# Patient Record
Sex: FEMALE | Race: WHITE | NOT HISPANIC OR LATINO | Employment: STUDENT | URBAN - METROPOLITAN AREA
[De-identification: names, ages, dates, MRNs, and addresses within clinical notes are randomized per-mention and may not be internally consistent; named-entity substitution may affect disease eponyms.]

---
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
## 2023-02-16 DIAGNOSIS — Z30.09 GENERAL COUNSELING FOR PRESCRIPTION OF ORAL CONTRACEPTIVES: ICD-10-CM

## 2023-02-17 RX ORDER — DESOGESTREL AND ETHINYL ESTRADIOL 0.15-0.03
1 KIT ORAL DAILY
Qty: 84 TABLET | Refills: 0 | Status: SHIPPED | OUTPATIENT
Start: 2023-02-17 | End: 2023-05-11

## 2023-03-28 ENCOUNTER — TELEPHONE (OUTPATIENT)
Dept: ORTHOPEDICS | Facility: CLINIC | Age: 18
End: 2023-03-28
Payer: COMMERCIAL

## 2023-03-28 NOTE — TELEPHONE ENCOUNTER
Offered patient earlier time slot per provider request. Patient would like to keep current time slot.     Wilfred Silvestre ATC

## 2023-03-30 ENCOUNTER — TELEPHONE (OUTPATIENT)
Dept: ORTHOPEDICS | Facility: CLINIC | Age: 18
End: 2023-03-30
Payer: COMMERCIAL

## 2023-03-30 NOTE — TELEPHONE ENCOUNTER
Per Provider, Patient has not had work up and should be seen by sports med before she is ortho surgery. Patient was called agreed to reschedule with Dr. Bolton on the same day.     Wilfred Silvestre ATC

## 2023-04-07 ENCOUNTER — ANCILLARY PROCEDURE (OUTPATIENT)
Dept: GENERAL RADIOLOGY | Facility: CLINIC | Age: 18
End: 2023-04-07
Attending: STUDENT IN AN ORGANIZED HEALTH CARE EDUCATION/TRAINING PROGRAM
Payer: COMMERCIAL

## 2023-04-07 ENCOUNTER — OFFICE VISIT (OUTPATIENT)
Dept: ORTHOPEDICS | Facility: CLINIC | Age: 18
End: 2023-04-07
Payer: COMMERCIAL

## 2023-04-07 VITALS — BODY MASS INDEX: 22.82 KG/M2 | HEIGHT: 71 IN | WEIGHT: 163 LBS

## 2023-04-07 DIAGNOSIS — M25.512 ACUTE PAIN OF LEFT SHOULDER: ICD-10-CM

## 2023-04-07 DIAGNOSIS — M25.312 SHOULDER INSTABILITY, LEFT: ICD-10-CM

## 2023-04-07 DIAGNOSIS — S43.432A SUPERIOR LABRUM ANTERIOR-TO-POSTERIOR (SLAP) TEAR OF LEFT SHOULDER: ICD-10-CM

## 2023-04-07 DIAGNOSIS — M25.512 ACUTE PAIN OF LEFT SHOULDER: Primary | ICD-10-CM

## 2023-04-07 PROCEDURE — 99204 OFFICE O/P NEW MOD 45 MIN: CPT | Performed by: STUDENT IN AN ORGANIZED HEALTH CARE EDUCATION/TRAINING PROGRAM

## 2023-04-07 PROCEDURE — 73030 X-RAY EXAM OF SHOULDER: CPT | Mod: TC | Performed by: RADIOLOGY

## 2023-04-07 NOTE — LETTER
4/7/2023         RE: Bert Diaz  [address] [address]        Dear Colleague,    Thank you for referring your patient, Bert Diaz, to the [facility] [place]. Please see a copy of my visit note below.    ASSESSMENT & PLAN    1. Acute pain of left shoulder    2. Shoulder instability, left    3. 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 History, examination and X-ray imaging were reviewed today, most consistent with glenohumeral instability with suspected underlying SLAP tear. Of note, the patient experienced an instability event (left shoulder subluxation) during today's clinic visit with light resistance during the [place]'s maneuver. We reviewed work up and treatment options, and plan to proceed with the following:     - Your left shoulder MR Arthrogram has been ordered. Please call [phone] to schedule.     - A referral has been placed for formal physical therapy. Please call [phone] to schedule with one of our Sports Medicine Physical Therapists (Sanrda Zambrano or Mat Yip). Exercises to include passive range of motion exercises with progression to proprioceptive neuromuscular facilitation exercises and scapular stabilization exercises.  Progress to rotator cuff strengthening and capsular stretching exercises with use of modalities as needed.  Continued progression to functional and sport specific strengthening exercises with home exercise prescription.    - Referral placed to schedule a surgical consult with Dr. Marion Krishna, our sports/shoulder surgeon. Please call [phone] to schedule this consult to take place after the MR Arthrogram and 4-6 weeks of physical therapy.     - Ibuprofen 400-600 mg with food every 4-6  hours as needed for pain.   - Ice the shoulder for 10-15 minutes 3-4 times per day as needed for pain.   - Sling for comfort as needed.  - Avoid activities that provoke pain or instability sensation. This includes lifting and repetitive activity above shoulder height.     You may call our direct clinic number ([phone]) at any time with questions or concerns.    Jacqui Barcenas MD, [facility]    -----    SUBJECTIVE  Bert Diaz is a/an 17 year old Right handed female who is seen as a self referral for evaluation of left shoulder pain and instability. The patient is seen with their father.    Onset: 4 month(s) ago. Reports insidious onset of left shoulder pain and sensation of instability without acute precipitating event. She has a history of similar symptoms of the right shoulder following a volleyball injury, for which the right shoulder dislocated and a labral tear was identified and repaired in 2018. Her left shoulder developed similar symptoms without acute injury or inciting event approximately 4 months ago. Symptoms have gradually worsened and limit her activities.    Location of Pain: left anterior and deep shoulder  Rating of Pain at worst: 7/10  Rating of Pain Currently: 0/10 at rest  Worsened by: pain after running, sleeping with arm overhead, lifting overhead  Better with: rest / activity avoidance  Treatments tried: rest/activity avoidance and Tylenol PRN  Associated symptoms: feeling of instability and popping / shifting  Orthopedic history: YES - patient reports h/o instability in bilateral shoulders  Relevant surgical history: YES - right shoulder arthroscopic glenoid labrum repair Date: 11/21/2018 at [place]. She completed pre- and post-op rehab and has noted excellent results.   Social history: Freshman at [employer], generally active but not involved in college athletics    Past Medical History:   Diagnosis Date     Transitory tachypnea  "of      48 hours in special care     Social History     Socioeconomic History     Marital status: Single   Tobacco Use     Smoking status: Never     Smokeless tobacco: Never     Tobacco comments:     Non-smoking home   Vaping Use     Vaping status: Never Used   Substance and Sexual Activity     Alcohol use: No     Drug use: No     Sexual activity: Never         Patient's past medical, surgical, social, and family histories were reviewed today and no changes are noted.    REVIEW OF SYSTEMS:  10 point ROS is negative other than symptoms noted above in HPI, Past Medical History or as stated below  Constitutional: NEGATIVE for fever, chills, change in weight  Skin: NEGATIVE for worrisome rashes, moles or lesions  GI/: NEGATIVE for bowel or bladder changes  Neuro: NEGATIVE for weakness, dizziness or paresthesias    OBJECTIVE:  Ht 1.803 m (5' 11\")   Wt 73.9 kg (163 lb)   BMI 22.73 kg/m     General: healthy, alert and in no distress  HEENT: no scleral icterus or conjunctival erythema  Skin: no suspicious lesions or rash. No jaundice.  CV: regular rhythm by palpation  Resp: normal respiratory effort without conversational dyspnea   Psych: normal mood and affect  Gait: normal steady gait with appropriate coordination and balance  Neuro: normal light touch sensory exam of the bilateral upper extremities.    MSK:  LEFT SHOULDER  Inspection:    No swelling, bruising, discoloration, or obvious deformity or asymmetry  Palpation:    Tender about the anterior capsule. Remainder of bony and tendinous landmarks are nontender.  Active Range of Motion:     Abduction 1800, FF 1800, , IR T2.    Strength:    Scapular plane abduction 5/5,  ER 5/5, IR 5/5 +mild pain, biceps 5/5, triceps 5/5  Special Tests:    Positive: McKenzie's provokes subluxation event with minimal resistance, apprehension/relocation, load and shift and clunk    Negative: Neer's, Fuller', supraspinatus (empty can), drop arm/painful arc, lift-off, " Speed's and Erika's    Independent visualization of the below image:  Recent Results (from the past 24 hour(s))   XR Shoulder Left G/E 3 Views    Narrative    XR SHOULDER LEFT G/E 3 VIEWS 4/7/2023 3:30 PM    HISTORY: Acute pain of left shoulder    COMPARISON: None.      Impression    IMPRESSION: No fracture or dislocation. No degenerative changes.    WIL WHITTAKER MD         SYSTEM ID:  [id number]     Jacqui Barcenas MD, [facility]        Again, thank you for allowing me to participate in the care of your patient.        Sincerely,        Jacqui Barcenas MD

## 2023-04-07 NOTE — PROGRESS NOTES
ASSESSMENT & PLAN    1. Acute pain of left shoulder    2. Shoulder instability, left    3. 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 History, examination and X-ray imaging were reviewed today, most consistent with glenohumeral instability with suspected underlying SLAP tear. Of note, the patient experienced an instability event (left shoulder subluxation) during today's clinic visit with light resistance during the Lipan's maneuver. We reviewed work up and treatment options, and plan to proceed with the following:     - Your left shoulder MR Arthrogram has been ordered. Please call [phone] to schedule.     - A referral has been placed for formal physical therapy. Please call [phone] to schedule with one of our Sports Medicine Physical Therapists (Sandra Zambrano or Mat Yip). Exercises to include passive range of motion exercises with progression to proprioceptive neuromuscular facilitation exercises and scapular stabilization exercises.  Progress to rotator cuff strengthening and capsular stretching exercises with use of modalities as needed.  Continued progression to functional and sport specific strengthening exercises with home exercise prescription.    - Referral placed to schedule a surgical consult with Dr. Marion Krishna, our sports/shoulder surgeon. Please call [phone] to schedule this consult to take place after the MR Arthrogram and 4-6 weeks of physical therapy.     - Ibuprofen 400-600 mg with food every 4-6 hours as needed for pain.   - Ice the shoulder for 10-15 minutes 3-4 times per day as needed for pain.   - Sling for comfort as needed.  - Avoid activities that provoke pain or instability sensation. This includes lifting and repetitive activity above shoulder height.     You may  call our direct clinic number ([phone]) at any time with questions or concerns.    Jacqui Barcenas MD, [facility]    -----    SUBJECTIVE  Bert Diaz is a/an 17 year old Right handed female who is seen as a self referral for evaluation of left shoulder pain and instability. The patient is seen with their father.    Onset: 4 month(s) ago. Reports insidious onset of left shoulder pain and sensation of instability without acute precipitating event. She has a history of similar symptoms of the right shoulder following a volleyball injury, for which the right shoulder dislocated and a labral tear was identified and repaired in 2018. Her left shoulder developed similar symptoms without acute injury or inciting event approximately 4 months ago. Symptoms have gradually worsened and limit her activities.    Location of Pain: left anterior and deep shoulder  Rating of Pain at worst: 7/10  Rating of Pain Currently: 0/10 at rest  Worsened by: pain after running, sleeping with arm overhead, lifting overhead  Better with: rest / activity avoidance  Treatments tried: rest/activity avoidance and Tylenol PRN  Associated symptoms: feeling of instability and popping / shifting  Orthopedic history: YES - patient reports h/o instability in bilateral shoulders  Relevant surgical history: YES - right shoulder arthroscopic glenoid labrum repair Date: 2018 at [facility]. She completed pre- and post-op rehab and has noted excellent results.   Social history: Freshman at [employer], generally active but not involved in college athletics    Past Medical History:   Diagnosis Date     Transitory tachypnea of      48 hours in special care     Social History     Socioeconomic History     Marital status: Single   Tobacco Use     Smoking status: Never     Smokeless tobacco: Never     Tobacco comments:     Non-smoking home   Vaping Use     Vaping status: Never Used   Substance  "and Sexual Activity     Alcohol use: No     Drug use: No     Sexual activity: Never         Patient's past medical, surgical, social, and family histories were reviewed today and no changes are noted.    REVIEW OF SYSTEMS:  10 point ROS is negative other than symptoms noted above in HPI, Past Medical History or as stated below  Constitutional: NEGATIVE for fever, chills, change in weight  Skin: NEGATIVE for worrisome rashes, moles or lesions  GI/: NEGATIVE for bowel or bladder changes  Neuro: NEGATIVE for weakness, dizziness or paresthesias    OBJECTIVE:  Ht 1.803 m (5' 11\")   Wt 73.9 kg (163 lb)   BMI 22.73 kg/m     General: healthy, alert and in no distress  HEENT: no scleral icterus or conjunctival erythema  Skin: no suspicious lesions or rash. No jaundice.  CV: regular rhythm by palpation  Resp: normal respiratory effort without conversational dyspnea   Psych: normal mood and affect  Gait: normal steady gait with appropriate coordination and balance  Neuro: normal light touch sensory exam of the bilateral upper extremities.    MSK:  LEFT SHOULDER  Inspection:    No swelling, bruising, discoloration, or obvious deformity or asymmetry  Palpation:    Tender about the anterior capsule. Remainder of bony and tendinous landmarks are nontender.  Active Range of Motion:     Abduction 1800, FF 1800, , IR T2.    Strength:    Scapular plane abduction 5/5,  ER 5/5, IR 5/5 +mild pain, biceps 5/5, triceps 5/5  Special Tests:    Positive: Fields's provokes subluxation event with minimal resistance, apprehension/relocation, load and shift and clunk    Negative: Neer's, Fuller', supraspinatus (empty can), drop arm/painful arc, lift-off, Speed's and Yergason's    Independent visualization of the below image:  Recent Results (from the past 24 hour(s))   XR Shoulder Left G/E 3 Views    Narrative    XR SHOULDER LEFT G/E 3 VIEWS 4/7/2023 3:30 PM    HISTORY: Acute pain of left shoulder    COMPARISON: None.      Impression "    IMPRESSION: No fracture or dislocation. No degenerative changes.    WIL WHITTAKER MD         SYSTEM ID:  [id number]     Jacqui Barcenas MD, [facility]

## 2023-04-07 NOTE — PATIENT INSTRUCTIONS
1. Acute pain of left shoulder    2. Shoulder instability, left    3. 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 History, examination and X-ray imaging were reviewed today, most consistent with suspected glenoid labral tear with instability event (subluxation) during today's clinic visit. We reviewed work up and treatment options, and plan to proceed with the following:     - Your left shoulder MR Arthrogram has been ordered. Please call [phone] to schedule.     - A referral has been placed for formal physical therapy. Please call [phone] to schedule with one of our Sports Medicine Physical Therapists (Sandra Zambrano or Mat Yip). Exercises to include passive range of motion exercises with progression to proprioceptive neuromuscular facilitation exercises and scapular stabilization exercises.  Progress to rotator cuff strengthening and capsular stretching exercises with use of modalities as needed.  Continued progression to functional and sport specific strengthening exercises with home exercise prescription.    - Referral placed to schedule a surgical consult with Dr. Marion Krishna, our sports/shoulder surgeon. Please call [phone] to schedule this consult to take place after the MR Arthrogram and 4-6 weeks of physical therapy.     - Ibuprofen 400-600 mg with food every 4-6 hours as needed for pain.   - Ice the shoulder for 10-15 minutes 3-4 times per day as needed for pain.   - Sling for comfort as needed.  - Avoid activities that provoke pain or instability sensation. This includes lifting and repetitive activity above shoulder height.     You may call our direct clinic number ([phone]) at any time with questions or concerns.    Jacqui Barcenas MD, [facility]

## 2023-05-02 ENCOUNTER — E-VISIT (OUTPATIENT)
Dept: URGENT CARE | Facility: CLINIC | Age: 18
End: 2023-05-02
Payer: COMMERCIAL

## 2023-05-02 DIAGNOSIS — N39.0 ACUTE UTI (URINARY TRACT INFECTION): Primary | ICD-10-CM

## 2023-05-02 PROCEDURE — 99421 OL DIG E/M SVC 5-10 MIN: CPT | Performed by: EMERGENCY MEDICINE

## 2023-05-02 RX ORDER — NITROFURANTOIN 25; 75 MG/1; MG/1
100 CAPSULE ORAL 2 TIMES DAILY
Qty: 10 CAPSULE | Refills: 0 | Status: SHIPPED | OUTPATIENT
Start: 2023-05-02 | End: 2023-05-07

## 2023-05-02 NOTE — PATIENT INSTRUCTIONS
Dear Bert Diaz    After reviewing your responses, I've been able to diagnose you with a urinary tract infection, which is a common infection of the bladder with bacteria.  This is not a sexually transmitted infection, though urinating immediately after intercourse can help prevent infections.  Drinking lots of fluids is also helpful to clear your current infection and prevent the next one.      I have sent a prescription for antibiotics to your pharmacy to treat this infection.    It is important that you take all of your prescribed medication even if your symptoms are improving after a few doses.  Taking all of your medicine helps prevent the symptoms from returning.     If your symptoms worsen, you develop pain in your back or stomach, develop fevers, or are not improving in 5 days, please contact your primary care provider for an appointment or visit any of our convenient Walk-in or Urgent Care Centers to be seen, which can be found on our website here.    Thanks again for choosing us as your health care partner,    Ayush Thomas MD    Urinary Tract Infections in Women  Urinary tract infections (UTIs) are most often caused by bacteria. These bacteria enter the urinary tract. The bacteria may come from inside the body. Or they may travel from the skin outside the rectum or vagina into the urethra. Female anatomy makes it easy for bacteria from the bowel to enter a woman s urinary tract, which is the most common source of UTI. This means women develop UTIs more often than men. Pain in or around the urinary tract is a common UTI symptom. But the only way to know for sure if you have a UTI for the healthcare provider to test your urine. The two tests that may be done are the urinalysis and urine culture.    Types of UTIs    Cystitis. A bladder infection (cystitis) is the most common UTI in women. You may have urgent or frequent need to pee. You may also have pain, burning when you pee, and bloody  urine.    Urethritis. This is an inflamed urethra, which is the tube that carries urine from the bladder to outside the body. You may have lower stomach or back pain. You may also have urgent or frequent need to pee.    Pyelonephritis. This is a kidney infection. If not treated, it can be serious and damage your kidneys. In severe cases, you may need to stay in the hospital. You may have a fever and lower back pain.    Medicines to treat a UTI  Most UTIs are treated with antibiotics. These kill the bacteria. The length of time you need to take them depends on the type of infection. It may be as short as 3 days. If you have repeated UTIs, you may need a low-dose antibiotic for several months. Take antibiotics exactly as directed. Don t stop taking them until all of the medicine is gone, even if you feel better. If you stop taking the antibiotic too soon, the infection may not go away. You may also develop a resistance to the antibiotic. This can make it much harder to treat.  Lifestyle changes to treat and prevent UTIs  The lifestyle changes below will help get rid of your UTI. They may also help prevent future UTIs.    Drink plenty of fluids. This includes water, juice, or other caffeine-free drinks. Fluids help flush bacteria out of your body.    Empty your bladder. Always empty your bladder when you feel the urge to pee. And always pee before going to sleep. Urine that stays in your bladder can lead to infection. Try to pee before and after sex as well.    Practice good personal hygiene. Wipe yourself from front to back after using the toilet. This helps keep bacteria from getting into the urethra.    Wear cotton underwear. Don't wear synthetic or tight-fitting underwear that can trap moisture. Change out of wet bathing suits and workout clothing quickly.    Take showers. Showers are better than baths for preventing UTIs.    Use condoms during sex. These help prevent UTIs caused by sexually transmitted bacteria.  Also don't use spermicides during sex. These can increase the risk for UTIs. Choose other forms of birth control instead. For women who tend to get UTIs after sex, a low-dose of a preventive antibiotic may be used. Be sure to discuss this option with your healthcare provider.    Follow up with your healthcare provider as directed. They may test to make sure the infection has cleared. If needed, more treatment may be started.  [employer] last reviewed this educational content on 9/1/2021 2000-2022 [employer]. All rights reserved. This information is not intended as a substitute for professional medical care. Always follow your healthcare professional's instructions.

## 2023-05-11 DIAGNOSIS — Z30.09 GENERAL COUNSELING FOR PRESCRIPTION OF ORAL CONTRACEPTIVES: ICD-10-CM

## 2023-05-11 RX ORDER — DESOGESTREL AND ETHINYL ESTRADIOL 0.15-0.03
1 KIT ORAL DAILY
Qty: 28 TABLET | Refills: 0 | Status: SHIPPED | OUTPATIENT
Start: 2023-05-11 | End: 2023-05-25

## 2023-05-11 NOTE — TELEPHONE ENCOUNTER
Routing refill request to provider for review/approval because:  Bethany given x1 and patient did not follow up, please advise  Pt did see a [place] provider in December, but protocol states failure. Pt has been contacted x3 in February to schedule.

## 2023-05-15 NOTE — TELEPHONE ENCOUNTER
2nd attempt: left VM to return call. If pt calls back, please assist in scheduling annual physical. Needs appointment for further refills.    Kathrin Ibarra MA 4:12 PM 5/15/2023

## 2023-05-24 ASSESSMENT — PATIENT HEALTH QUESTIONNAIRE - PHQ9
10. IF YOU CHECKED OFF ANY PROBLEMS, HOW DIFFICULT HAVE THESE PROBLEMS MADE IT FOR YOU TO DO YOUR WORK, TAKE CARE OF THINGS AT HOME, OR GET ALONG WITH OTHER PEOPLE: SOMEWHAT DIFFICULT
SUM OF ALL RESPONSES TO PHQ QUESTIONS 1-9: 3
SUM OF ALL RESPONSES TO PHQ QUESTIONS 1-9: 3

## 2023-05-25 ENCOUNTER — OFFICE VISIT (OUTPATIENT)
Dept: PEDIATRICS | Facility: CLINIC | Age: 18
End: 2023-05-25
Payer: COMMERCIAL

## 2023-05-25 VITALS
WEIGHT: 165.5 LBS | BODY MASS INDEX: 23.17 KG/M2 | HEART RATE: 79 BPM | SYSTOLIC BLOOD PRESSURE: 124 MMHG | TEMPERATURE: 98.3 F | OXYGEN SATURATION: 99 % | DIASTOLIC BLOOD PRESSURE: 76 MMHG | HEIGHT: 71 IN | RESPIRATION RATE: 16 BRPM

## 2023-05-25 DIAGNOSIS — Z11.59 NEED FOR HEPATITIS C SCREENING TEST: ICD-10-CM

## 2023-05-25 DIAGNOSIS — Z11.3 SCREENING FOR STDS (SEXUALLY TRANSMITTED DISEASES): ICD-10-CM

## 2023-05-25 DIAGNOSIS — Z11.4 SCREENING FOR HIV (HUMAN IMMUNODEFICIENCY VIRUS): ICD-10-CM

## 2023-05-25 DIAGNOSIS — F32.0 MILD MAJOR DEPRESSION, SINGLE EPISODE (H): ICD-10-CM

## 2023-05-25 DIAGNOSIS — F41.1 GAD (GENERALIZED ANXIETY DISORDER): ICD-10-CM

## 2023-05-25 DIAGNOSIS — Z30.09 GENERAL COUNSELING FOR PRESCRIPTION OF ORAL CONTRACEPTIVES: Primary | ICD-10-CM

## 2023-05-25 LAB
C TRACH DNA SPEC QL NAA+PROBE: NEGATIVE
CLUE CELLS: ABNORMAL
N GONORRHOEA DNA SPEC QL NAA+PROBE: NEGATIVE
TRICHOMONAS, WET PREP: ABNORMAL
WBC'S/HIGH POWER FIELD, WET PREP: ABNORMAL
YEAST, WET PREP: ABNORMAL

## 2023-05-25 PROCEDURE — 87591 N.GONORRHOEAE DNA AMP PROB: CPT | Performed by: NURSE PRACTITIONER

## 2023-05-25 PROCEDURE — 99213 OFFICE O/P EST LOW 20 MIN: CPT | Performed by: NURSE PRACTITIONER

## 2023-05-25 PROCEDURE — 86803 HEPATITIS C AB TEST: CPT | Performed by: NURSE PRACTITIONER

## 2023-05-25 PROCEDURE — 86780 TREPONEMA PALLIDUM: CPT | Performed by: NURSE PRACTITIONER

## 2023-05-25 PROCEDURE — 36415 COLL VENOUS BLD VENIPUNCTURE: CPT | Performed by: NURSE PRACTITIONER

## 2023-05-25 PROCEDURE — 87389 HIV-1 AG W/HIV-1&-2 AB AG IA: CPT | Performed by: NURSE PRACTITIONER

## 2023-05-25 PROCEDURE — 87491 CHLMYD TRACH DNA AMP PROBE: CPT | Performed by: NURSE PRACTITIONER

## 2023-05-25 PROCEDURE — 87210 SMEAR WET MOUNT SALINE/INK: CPT | Performed by: NURSE PRACTITIONER

## 2023-05-25 RX ORDER — DESOGESTREL AND ETHINYL ESTRADIOL 0.15-0.03
1 KIT ORAL DAILY
Qty: 84 TABLET | Refills: 3 | Status: SHIPPED | OUTPATIENT
Start: 2023-05-25 | End: 2024-04-08

## 2023-05-25 ASSESSMENT — PATIENT HEALTH QUESTIONNAIRE - PHQ9
SUM OF ALL RESPONSES TO PHQ QUESTIONS 1-9: 3
10. IF YOU CHECKED OFF ANY PROBLEMS, HOW DIFFICULT HAVE THESE PROBLEMS MADE IT FOR YOU TO DO YOUR WORK, TAKE CARE OF THINGS AT HOME, OR GET ALONG WITH OTHER PEOPLE: SOMEWHAT DIFFICULT

## 2023-05-25 ASSESSMENT — PAIN SCALES - GENERAL: PAINLEVEL: NO PAIN (0)

## 2023-05-25 NOTE — PROGRESS NOTES
"  Assessment & Plan     General counseling for prescription of oral contraceptives  Risks, benefits and potential side effects of OCPs were reviewed with the patient and warning signs of serious side effects--including thromboembolic complications were reviewed.  She has no concerns with OCP, ok to fill. NO contraindications.  - desogestrel-ethinyl estradiol (ISIBLOOM) 0.15-30 MG-MCG tablet; Take 1 tablet by mouth daily Visit needed prior to additional fills.    Screening for STDs (sexually transmitted diseases)  Asymptomatic and no known exposures. Her partner has \"dysuria\" but hasn't been tested for STIs. If partner does not opt to test then should retest in 1-3 months.  - NEISSERIA GONORRHOEA PCR; Future  - CHLAMYDIA TRACHOMATIS PCR; Future  - Treponema Abs w Reflex to RPR and Titer; Future  - Wet prep - lab collect; Future  - NEISSERIA GONORRHOEA PCR  - CHLAMYDIA TRACHOMATIS PCR  - Treponema Abs w Reflex to RPR and Titer  - Wet prep - lab collect    Screening for HIV (human immunodeficiency virus)  - HIV Antigen Antibody Combo; Future  - HIV Antigen Antibody Combo    Need for hepatitis C screening test  - Hepatitis C Screen Reflex to HCV RNA Quant and Genotype; Future  - Hepatitis C Screen Reflex to HCV RNA Quant and Genotype    APPEL (generalized anxiety disorder)  Mild major depression, single episode (H)  Stable, no refills needed.         Patient Instructions   I will message you your lab results  Birth control refilled  Schedule physical before you go back to school       Nevaeh Barnes NP  [facility] JORGE Noel is a 17 year old, presenting for the following health issues:  Contraception (Patient is here for a refill of birth control and for STD testing.) and STD (Here for STD testing.)        5/25/2023    10:10 AM   Additional Questions   Roomed by Lynnette BROWN   Accompanied by self         5/25/2023    10:10 AM   Patient Reported Additional Medications   Patient reports taking " "the following new medications none     Contraception    STD    History of Present Illness       Reason for visit:  I need a refill for my birth control and i would also like to do a full STD screening.    She eats 2-3 servings of fruits and vegetables daily.She consumes 0 sweetened beverage(s) daily.She exercises with enough effort to increase her heart rate 30 to 60 minutes per day.  She exercises with enough effort to increase her heart rate 4 days per week.   She is taking medications regularly.    Today's PHQ-9         PHQ-9 Total Score: 3    PHQ-9 Q9 Thoughts of better off dead/self-harm past 2 weeks :   Not at all    How difficult have these problems made it for you to do your work, take care of things at home, or get along with other people: Somewhat difficult       Concern - Patient is here for refill of birth control and for STD testing.    Boyfriend had \"burning\" yesterday but no known hx of STIs or known STI exposures  Does not regularly use condoms  Denies dysuria, lesions, vag discharge    No concerns with OCP  Typically takes placebo pills  Denies hx of blood clot, non-smoker  Hx of migraine, no aura. Hasn't had headache in \"along time\"    Review of Systems         Objective    /76 (BP Location: Right arm, Patient Position: Sitting, Cuff Size: Adult Large)   Pulse 79   Temp 98.3  F (36.8  C) (Tympanic)   Resp 16   Ht 1.797 m (5' 10.75\")   Wt 75.1 kg (165 lb 8 oz)   LMP 05/11/2023 (Approximate)   SpO2 99%   BMI 23.25 kg/m    Body mass index is 23.25 kg/m .  Physical Exam   GENERAL: healthy, alert and no distress  PSYCH: mentation appears normal, affect normal/bright                    "

## 2023-05-25 NOTE — PATIENT INSTRUCTIONS
I will message you your lab results  Birth control refilled  Schedule physical before you go back to school

## 2023-05-26 LAB
HCV AB SERPL QL IA: NONREACTIVE
HIV 1+2 AB+HIV1 P24 AG SERPL QL IA: NONREACTIVE
T PALLIDUM AB SER QL: NONREACTIVE

## 2023-06-09 ENCOUNTER — E-VISIT (OUTPATIENT)
Dept: FAMILY MEDICINE | Facility: CLINIC | Age: 18
End: 2023-06-09
Payer: COMMERCIAL

## 2023-06-09 DIAGNOSIS — R30.0 DYSURIA: Primary | ICD-10-CM

## 2023-06-09 PROCEDURE — 99207 PR NON-BILLABLE SERV PER CHARTING: CPT | Performed by: PHYSICIAN ASSISTANT

## 2023-06-09 NOTE — PATIENT INSTRUCTIONS
Dear Bert Diaz,     After reviewing your responses, I would like you to come in for a urine test to make sure we treat you correctly. This urine test is to evaluate you for a possible urinary tract infection, and should be scheduled for today or tomorrow. Schedule a Lab Only appointment here.     Lab appointments are not available at most locations on the weekends. If no Lab Only appointment is available, you should be seen in any of our convenient Walk-in or Urgent Care Centers, which can be found on our website here.     You will receive instructions with your results in [place] once they are available.     If your symptoms worsen, you develop pain in your back or stomach, develop fevers, or are not improving in 5 days, please contact your primary care provider for an appointment or visit a Walk-in or Urgent Care Center to be seen.     Thanks again for choosing us as your health care partner,     Heather Trevino PA-C

## 2023-06-10 ENCOUNTER — OFFICE VISIT (OUTPATIENT)
Dept: URGENT CARE | Facility: URGENT CARE | Age: 18
End: 2023-06-10
Payer: COMMERCIAL

## 2023-06-10 VITALS
OXYGEN SATURATION: 98 % | TEMPERATURE: 98.7 F | HEART RATE: 84 BPM | SYSTOLIC BLOOD PRESSURE: 120 MMHG | DIASTOLIC BLOOD PRESSURE: 83 MMHG

## 2023-06-10 DIAGNOSIS — R30.0 DYSURIA: Primary | ICD-10-CM

## 2023-06-10 LAB
ALBUMIN UR-MCNC: NEGATIVE MG/DL
APPEARANCE UR: CLEAR
BACTERIA #/AREA URNS HPF: ABNORMAL /HPF
BILIRUB UR QL STRIP: NEGATIVE
COLOR UR AUTO: YELLOW
GLUCOSE UR STRIP-MCNC: NEGATIVE MG/DL
HGB UR QL STRIP: ABNORMAL
KETONES UR STRIP-MCNC: NEGATIVE MG/DL
LEUKOCYTE ESTERASE UR QL STRIP: NEGATIVE
NITRATE UR QL: NEGATIVE
PH UR STRIP: 6 [PH] (ref 5–7)
RBC #/AREA URNS AUTO: ABNORMAL /HPF
SP GR UR STRIP: >=1.03 (ref 1–1.03)
SQUAMOUS #/AREA URNS AUTO: ABNORMAL /LPF
UROBILINOGEN UR STRIP-ACNC: 0.2 E.U./DL
WBC #/AREA URNS AUTO: ABNORMAL /HPF

## 2023-06-10 PROCEDURE — 99213 OFFICE O/P EST LOW 20 MIN: CPT | Performed by: PHYSICIAN ASSISTANT

## 2023-06-10 PROCEDURE — 81001 URINALYSIS AUTO W/SCOPE: CPT | Performed by: PHYSICIAN ASSISTANT

## 2023-06-10 NOTE — PROGRESS NOTES
SUBJECTIVE:  Bert Diaz is a 17 year old female who comes in with concerns for possible UTI.  Patient states that she had a recent UTI and did an E-visit and given sulfa for 3 days.  Symptoms have returned.  She states that she does not have significant pain but does have some mild irritation with urination.  Denies any vaginal symptoms of discharge itching odor.  No STD concerns as just was recently tested.  She wants to have her urine checked to see if she still has a UTI.  Denies any blood in her urine.  No nausea, fever, vomiting, flank pain.  She is otherwise baseline health.    Past Medical History:   Diagnosis Date     Transitory tachypnea of      48 hours in special care     Current Outpatient Medications   Medication     desogestrel-ethinyl estradiol (ISIBLOOM) 0.15-30 MG-MCG tablet     escitalopram (LEXAPRO) 5 MG tablet     No current facility-administered medications for this visit.     Social History     Socioeconomic History     Marital status: Single     Spouse name: Not on file     Number of children: Not on file     Years of education: Not on file     Highest education level: Not on file   Occupational History     Not on file   Tobacco Use     Smoking status: Never     Smokeless tobacco: Never     Tobacco comments:     Non-smoking home   Vaping Use     Vaping status: Never Used   Substance and Sexual Activity     Alcohol use: No     Drug use: No     Sexual activity: Never   Other Topics Concern     Not on file   Social History Narrative     Not on file     Social Determinants of Health     Financial Resource Strain: Not on file   Food Insecurity: Not on file   Transportation Needs: Not on file   Physical Activity: Not on file   Stress: Not on file   Social Connections: Not on file   Intimate Partner Violence: Not on file   Housing Stability: Not on file     ROS  Negative other than stated above    Exam:  GENERAL APPEARANCE: healthy, alert and no distress  EYES: EOMI,  PERRL  ABDOMEN:   soft, nontender, no HSM or masses and bowel sounds normal.  No CVA tenderness noted  SKIN: no suspicious lesions or rashes    Results for orders placed or performed in visit on 06/10/23   UA Macroscopic with reflex to Microscopic and Culture     Status: Abnormal    Specimen: Urine, Clean Catch   Result Value Ref Range    Color Urine Yellow Colorless, Straw, Light Yellow, Yellow    Appearance Urine Clear Clear    Glucose Urine Negative Negative mg/dL    Bilirubin Urine Negative Negative    Ketones Urine Negative Negative mg/dL    Specific Gravity Urine >=1.030 1.003 - 1.035    Blood Urine Trace (A) Negative    pH Urine 6.0 5.0 - 7.0    Protein Albumin Urine Negative Negative mg/dL    Urobilinogen Urine 0.2 0.2, 1.0 E.U./dL    Nitrite Urine Negative Negative    Leukocyte Esterase Urine Negative Negative   UA Microscopic with Reflex to Culture     Status: Abnormal   Result Value Ref Range    Bacteria Urine Moderate (A) None Seen /HPF    RBC Urine 0-2 0-2 /HPF /HPF    WBC Urine 0-5 0-5 /HPF /HPF    Squamous Epithelials Urine Moderate (A) None Seen /LPF    Narrative    Urine Culture not indicated     assessment/plan:  (R30.0) Dysuria  (primary encounter diagnosis)  Comment:   Plan: UA Microscopic with Reflex to Culture,                 With a few day history of irritation with urination.  Her urine is clear and there is no evidence for infection.  Discussed in length the possibility of vaginal infection with similar symptoms and she does not believe so and does not need a wet prep at this time.  Concerns for STD has had a negative test recently.  Advised that she push fluid and continue to monitor symptoms.  If symptoms worsen call over the next 2 days and will send in prescription.  Patient notes understanding is agreement with above plan.

## 2023-09-07 ENCOUNTER — OFFICE VISIT (OUTPATIENT)
Dept: URGENT CARE | Facility: URGENT CARE | Age: 18
End: 2023-09-07
Payer: COMMERCIAL

## 2023-09-07 ENCOUNTER — E-VISIT (OUTPATIENT)
Dept: URGENT CARE | Facility: CLINIC | Age: 18
End: 2023-09-07
Payer: COMMERCIAL

## 2023-09-07 VITALS
BODY MASS INDEX: 23.18 KG/M2 | OXYGEN SATURATION: 94 % | DIASTOLIC BLOOD PRESSURE: 89 MMHG | SYSTOLIC BLOOD PRESSURE: 127 MMHG | WEIGHT: 165 LBS | TEMPERATURE: 98.4 F | HEART RATE: 85 BPM

## 2023-09-07 DIAGNOSIS — R30.0 DYSURIA: Primary | ICD-10-CM

## 2023-09-07 LAB
ALBUMIN UR-MCNC: NEGATIVE MG/DL
APPEARANCE UR: CLEAR
BACTERIA #/AREA URNS HPF: ABNORMAL /HPF
BILIRUB UR QL STRIP: NEGATIVE
COLOR UR AUTO: YELLOW
GLUCOSE UR STRIP-MCNC: NEGATIVE MG/DL
HGB UR QL STRIP: NEGATIVE
KETONES UR STRIP-MCNC: 15 MG/DL
LEUKOCYTE ESTERASE UR QL STRIP: ABNORMAL
NITRATE UR QL: NEGATIVE
PH UR STRIP: 7 [PH] (ref 5–7)
RBC #/AREA URNS AUTO: ABNORMAL /HPF
SP GR UR STRIP: 1.01 (ref 1–1.03)
SQUAMOUS #/AREA URNS AUTO: ABNORMAL /LPF
UROBILINOGEN UR STRIP-ACNC: 0.2 E.U./DL
WBC #/AREA URNS AUTO: ABNORMAL /HPF

## 2023-09-07 PROCEDURE — 99213 OFFICE O/P EST LOW 20 MIN: CPT | Performed by: PHYSICIAN ASSISTANT

## 2023-09-07 PROCEDURE — 87086 URINE CULTURE/COLONY COUNT: CPT | Performed by: PHYSICIAN ASSISTANT

## 2023-09-07 PROCEDURE — 99421 OL DIG E/M SVC 5-10 MIN: CPT | Performed by: PREVENTIVE MEDICINE

## 2023-09-07 PROCEDURE — 81001 URINALYSIS AUTO W/SCOPE: CPT | Performed by: PHYSICIAN ASSISTANT

## 2023-09-07 RX ORDER — SULFAMETHOXAZOLE/TRIMETHOPRIM 800-160 MG
1 TABLET ORAL 2 TIMES DAILY
Qty: 6 TABLET | Refills: 0 | Status: SHIPPED | OUTPATIENT
Start: 2023-09-07 | End: 2023-09-10

## 2023-09-07 ASSESSMENT — ENCOUNTER SYMPTOMS
DYSURIA: 1
FEVER: 0
DIARRHEA: 0
CONSTIPATION: 0
VOMITING: 0
NAUSEA: 0
FREQUENCY: 1
ABDOMINAL PAIN: 0

## 2023-09-07 NOTE — PROGRESS NOTES
Assessment & Plan:        ICD-10-CM    1. Dysuria  R30.0 UA Macroscopic with reflex to Microscopic and Culture - Clinic Collect     UA Microscopic with Reflex to Culture     Urine Culture Aerobic Bacterial - lab collect     sulfamethoxazole-trimethoprim (BACTRIM DS) 800-160 MG tablet            Plan/Clinical Decision Making:    Patient with dysuria since this morning. UA micro shows WBCs 5-10, UC pending. Possible infection vs contaminants. Will go ahead and treat with Bactrim due to symptoms. Continue to stay hydrated.       Return if symptoms worsen or fail to improve, for in 2-3 days.     At the end of the encounter, I discussed results, diagnosis, medications. Discussed red flags for immediate return to clinic/ER, as well as indications for follow up if no improvement. Patient understood and agreed to plan. Patient was stable for discharge.        Loretta Bocanegra PA-C on 2023 at 2:07 PM          Subjective:     HPI:    Bert is a 17 year old female who presents to clinic today for the following health issues:  Chief Complaint   Patient presents with    UTI     Possible UTI, symptoms started this morning and pt reports she's had a history of these. SXS: pain when urinating, dark colored urine and frequent urination. Pt took Ibuprofen today.     HPI    Frequency, urgency and burning since this morning.   Feels like typical symptoms with UTI. No fever, no vaginal, GI symptoms.   No flank pain.     History obtained from the patient.    Review of Systems   Constitutional:  Negative for fever.   Gastrointestinal:  Negative for abdominal pain, constipation, diarrhea, nausea and vomiting.   Genitourinary:  Positive for dysuria, frequency and urgency. Negative for vaginal discharge and vaginal pain.         Patient Active Problem List   Diagnosis    Mild major depression, single episode (H)    APPLE (generalized anxiety disorder)        Past Medical History:   Diagnosis Date    Transitory tachypnea of       48 hours in special care       Social History     Tobacco Use    Smoking status: Never    Smokeless tobacco: Never    Tobacco comments:     Non-smoking home   Substance Use Topics    Alcohol use: No             Objective:     Vitals:    09/07/23 1412   BP: 127/89   Pulse: 85   Temp: 98.4  F (36.9  C)   SpO2: 94%   Weight: 74.8 kg (165 lb)         Physical Exam   EXAM:   Pleasant, alert, appropriate appearance. NAD.  Head Exam: Normocephalic, atraumatic.  ABD: soft, Non-tender,   Back: no CVA tenderness  Neuro: CN II-XII intact grossly intact.  Skin: no rash or lesion.      Results:  Results for orders placed or performed in visit on 09/07/23   UA Macroscopic with reflex to Microscopic and Culture - Clinic Collect     Status: Abnormal    Specimen: Urine, Clean Catch   Result Value Ref Range    Color Urine Yellow Colorless, Straw, Light Yellow, Yellow    Appearance Urine Clear Clear    Glucose Urine Negative Negative mg/dL    Bilirubin Urine Negative Negative    Ketones Urine 15 (A) Negative mg/dL    Specific Gravity Urine 1.015 1.003 - 1.035    Blood Urine Negative Negative    pH Urine 7.0 5.0 - 7.0    Protein Albumin Urine Negative Negative mg/dL    Urobilinogen Urine 0.2 0.2, 1.0 E.U./dL    Nitrite Urine Negative Negative    Leukocyte Esterase Urine Trace (A) Negative   UA Microscopic with Reflex to Culture     Status: Abnormal   Result Value Ref Range    Bacteria Urine Few (A) None Seen /HPF    RBC Urine 0-2 0-2 /HPF /HPF    WBC Urine 5-10 (A) 0-5 /HPF /HPF    Squamous Epithelials Urine Few (A) None Seen /LPF    Narrative    Urine Culture not indicated

## 2023-09-07 NOTE — PATIENT INSTRUCTIONS
Dear Bert Diaz,     After reviewing your responses, I would like you to come in for a urine test to make sure we treat you correctly. This urine test is to evaluate you for a possible urinary tract infection, and should be scheduled for today or tomorrow. Schedule a Lab Only appointment here.     Lab appointments are not available at most locations on the weekends. If no Lab Only appointment is available, you should be seen in any of our convenient Walk-in or Urgent Care Centers, which can be found on our website here.     You will receive instructions with your results in [facility] once they are available.     If your symptoms worsen, you develop pain in your back or stomach, develop fevers, or are not improving in 5 days, please contact your primary care provider for an appointment or visit a Walk-in or Urgent Care Center to be seen.     Thanks again for choosing us as your health care partner,     Sahil Manley MD, MD

## 2023-09-09 LAB — BACTERIA UR CULT: NORMAL

## 2023-10-03 ENCOUNTER — E-VISIT (OUTPATIENT)
Dept: URGENT CARE | Facility: CLINIC | Age: 18
End: 2023-10-03
Payer: COMMERCIAL

## 2023-10-03 DIAGNOSIS — R30.0 DYSURIA: Primary | ICD-10-CM

## 2023-10-03 PROCEDURE — 99421 OL DIG E/M SVC 5-10 MIN: CPT | Performed by: EMERGENCY MEDICINE

## 2023-10-04 ENCOUNTER — OFFICE VISIT (OUTPATIENT)
Dept: URGENT CARE | Facility: URGENT CARE | Age: 18
End: 2023-10-04
Payer: COMMERCIAL

## 2023-10-04 VITALS
HEART RATE: 100 BPM | TEMPERATURE: 98.6 F | DIASTOLIC BLOOD PRESSURE: 84 MMHG | RESPIRATION RATE: 16 BRPM | SYSTOLIC BLOOD PRESSURE: 122 MMHG | OXYGEN SATURATION: 99 %

## 2023-10-04 DIAGNOSIS — R35.0 URINARY FREQUENCY: ICD-10-CM

## 2023-10-04 DIAGNOSIS — R30.0 DYSURIA: Primary | ICD-10-CM

## 2023-10-04 LAB
ALBUMIN UR-MCNC: NEGATIVE MG/DL
APPEARANCE UR: CLEAR
BACTERIA #/AREA URNS HPF: ABNORMAL /HPF
BILIRUB UR QL STRIP: NEGATIVE
CLUE CELLS: ABNORMAL
COLOR UR AUTO: YELLOW
GLUCOSE UR STRIP-MCNC: NEGATIVE MG/DL
HGB UR QL STRIP: NEGATIVE
KETONES UR STRIP-MCNC: NEGATIVE MG/DL
LEUKOCYTE ESTERASE UR QL STRIP: ABNORMAL
NITRATE UR QL: NEGATIVE
PH UR STRIP: 7 [PH] (ref 5–7)
RBC #/AREA URNS AUTO: ABNORMAL /HPF
SP GR UR STRIP: 1.02 (ref 1–1.03)
SQUAMOUS #/AREA URNS AUTO: ABNORMAL /LPF
TRICHOMONAS, WET PREP: ABNORMAL
UROBILINOGEN UR STRIP-ACNC: 0.2 E.U./DL
WBC #/AREA URNS AUTO: ABNORMAL /HPF
WBC'S/HIGH POWER FIELD, WET PREP: ABNORMAL
YEAST, WET PREP: ABNORMAL

## 2023-10-04 PROCEDURE — 87210 SMEAR WET MOUNT SALINE/INK: CPT | Performed by: FAMILY MEDICINE

## 2023-10-04 PROCEDURE — 99213 OFFICE O/P EST LOW 20 MIN: CPT | Performed by: FAMILY MEDICINE

## 2023-10-04 PROCEDURE — 81001 URINALYSIS AUTO W/SCOPE: CPT | Performed by: FAMILY MEDICINE

## 2023-10-04 NOTE — PROGRESS NOTES
URGENT CARE VISIT:    ASSESSMENT AND PLAN:      ICD-10-CM    1. Dysuria  R30.0 UA Macroscopic with reflex to Microscopic and Culture     Wet preparation     UA Microscopic with Reflex to Culture      2. Urinary frequency  R35.0             UA and wet prep today are unremarkable.  Provider did offer to complete chlamydia and gonorrhea testing today but patient declines.  Have her continue to monitor symptoms, supportive and OTC measures outlined in AVS and discussed with patient.    Red flag symptoms needing urgent evaluation discussed and printed off.    Follow up with primary care provider with any problems, questions or concerns or if symptoms worsen or fail to improve. Patient verbalized understanding and is agreeable to plan. The patient was discharged ambulatory and in stable condition.         SUBJECTIVE:   Bert Diaz is a 17 year old female who presents today for a possible UTI. Symptoms of dysuria and frequency have been going on for 2 day(s). Symptoms were sudden onset and moderate.  Patient denies back pain, nausea, vomiting, fever, and chills or urinary symptoms. This patient was last treated for presume UTI 1 mos ago and did feel relief after treatment.     PMH:   Past Medical History:   Diagnosis Date    Transitory tachypnea of      48 hours in special care     Allergies: Zithromax [azithromycin dihydrate]   Medications:   Current Outpatient Medications   Medication Sig Dispense Refill    desogestrel-ethinyl estradiol (ISIBLOOM) 0.15-30 MG-MCG tablet Take 1 tablet by mouth daily Visit needed prior to additional fills. 84 tablet 3    escitalopram (LEXAPRO) 5 MG tablet Take 1 tablet (5 mg) by mouth daily 90 tablet 3     Social History:   Social History     Tobacco Use    Smoking status: Never    Smokeless tobacco: Never    Tobacco comments:     Non-smoking home   Substance Use Topics    Alcohol use: No       ROS:  Review of systems negative except as stated above.    OBJECTIVE:  /84 (BP  Location: Right arm, Patient Position: Sitting, Cuff Size: Adult Regular)   Pulse 100   Temp 98.6  F (37  C) (Tympanic)   Resp 16   LMP 08/31/2023   SpO2 99%     GENERAL APPEARANCE: healthy, alert and no distress  EYES: EOMI,  PERRL, conjunctiva clear  HENT: ear canals and TM's normal.  Nose and mouth without ulcers, erythema or lesions  NECK: supple, nontender, no lymphadenopathy  RESP: lungs clear to auscultation - no rales, rhonchi or wheezes  CV: regular rates and rhythm, normal S1 S2, no murmur noted  SKIN: no suspicious lesions or rashes  Back: negative CVA tenderness    Labs:      Results for orders placed or performed in visit on 10/04/23 (from the past 24 hour(s))   UA Macroscopic with reflex to Microscopic and Culture    Specimen: Urine, Midstream   Result Value Ref Range    Color Urine Yellow Colorless, Straw, Light Yellow, Yellow    Appearance Urine Clear Clear    Glucose Urine Negative Negative mg/dL    Bilirubin Urine Negative Negative    Ketones Urine Negative Negative mg/dL    Specific Gravity Urine 1.020 1.003 - 1.035    Blood Urine Negative Negative    pH Urine 7.0 5.0 - 7.0    Protein Albumin Urine Negative Negative mg/dL    Urobilinogen Urine 0.2 0.2, 1.0 E.U./dL    Nitrite Urine Negative Negative    Leukocyte Esterase Urine Trace (A) Negative   Wet preparation    Specimen: Vagina; Swab   Result Value Ref Range    Trichomonas Absent Absent    Yeast Absent Absent    Clue Cells Absent Absent    WBCs/high power field 2+ (A) None   UA Microscopic with Reflex to Culture   Result Value Ref Range    Bacteria Urine Few (A) None Seen /HPF    RBC Urine 0-2 0-2 /HPF /HPF    WBC Urine 0-5 0-5 /HPF /HPF    Squamous Epithelials Urine Few (A) None Seen /LPF    Narrative    Urine Culture not indicated

## 2023-11-26 ENCOUNTER — HEALTH MAINTENANCE LETTER (OUTPATIENT)
Age: 18
End: 2023-11-26

## 2024-01-07 ENCOUNTER — OFFICE VISIT (OUTPATIENT)
Dept: URGENT CARE | Facility: URGENT CARE | Age: 19
End: 2024-01-07
Payer: COMMERCIAL

## 2024-01-07 VITALS
HEIGHT: 71 IN | SYSTOLIC BLOOD PRESSURE: 136 MMHG | OXYGEN SATURATION: 99 % | WEIGHT: 170 LBS | HEART RATE: 106 BPM | BODY MASS INDEX: 23.8 KG/M2 | TEMPERATURE: 98.9 F | DIASTOLIC BLOOD PRESSURE: 92 MMHG

## 2024-01-07 DIAGNOSIS — Z11.3 SCREEN FOR STD (SEXUALLY TRANSMITTED DISEASE): Primary | ICD-10-CM

## 2024-01-07 LAB
CLUE CELLS: ABNORMAL
TRICHOMONAS, WET PREP: ABNORMAL
WBC'S/HIGH POWER FIELD, WET PREP: ABNORMAL
YEAST, WET PREP: ABNORMAL

## 2024-01-07 PROCEDURE — 87389 HIV-1 AG W/HIV-1&-2 AB AG IA: CPT | Performed by: FAMILY MEDICINE

## 2024-01-07 PROCEDURE — 87491 CHLMYD TRACH DNA AMP PROBE: CPT | Performed by: FAMILY MEDICINE

## 2024-01-07 PROCEDURE — 99213 OFFICE O/P EST LOW 20 MIN: CPT | Performed by: FAMILY MEDICINE

## 2024-01-07 PROCEDURE — 87210 SMEAR WET MOUNT SALINE/INK: CPT

## 2024-01-07 PROCEDURE — 36415 COLL VENOUS BLD VENIPUNCTURE: CPT | Performed by: FAMILY MEDICINE

## 2024-01-07 PROCEDURE — 87591 N.GONORRHOEAE DNA AMP PROB: CPT | Performed by: FAMILY MEDICINE

## 2024-01-07 PROCEDURE — 86780 TREPONEMA PALLIDUM: CPT | Performed by: FAMILY MEDICINE

## 2024-01-07 NOTE — PATIENT INSTRUCTIONS
Check MyChart for lab results in 48-72 hours.  We will contact you if there are any results that require treatment/further evaluation.    Establish care with primary care provider.  Consider talking to them about having standing orders for routine STI screenings.

## 2024-01-07 NOTE — PROGRESS NOTES
"  ICD-10-CM    1. Screen for STD (sexually transmitted disease)  Z11.3 Chlamydia trachomatis/Neisseria gonorrhoeae by PCR - Clinic Collect     Wet prep - lab collect     Wet prep - lab collect     HIV Antigen Antibody Combo     Treponema Abs w Reflex to RPR and Titer     HIV Antigen Antibody Combo     Treponema Abs w Reflex to RPR and Titer        Wet prep normal today.  Pt open to HIV and syphilis screening.  Has already had hep C screening within the last 12 months, and I don't think she's at high risk for this infection at this time so will hold of on testing again today.    Patient Instructions   Check MyChart for lab results in 48-72 hours.  We will contact you if there are any results that require treatment/further evaluation.    Establish care with primary care provider.  Consider talking to them about having standing orders for routine STI screenings.    SUBJECTIVE:  Bert Diaz is a 18 year oldfemale who presents to  today requesting routine STI screening.  She is sexually active with one male partner---they've been together for the last few months.  No current genital symptoms.  Knows that her current partner has had multiple past partners.    OBJECTIVE:  BP (!) 136/92   Pulse 106   Temp 98.9  F (37.2  C) (Tympanic)   Ht 1.803 m (5' 11\")   Wt 77.1 kg (170 lb)   SpO2 99%   BMI 23.71 kg/m    GEN: well-appearing, in NAD    Results for orders placed or performed in visit on 01/07/24   Wet prep - lab collect     Status: Abnormal    Specimen: Vagina; Swab   Result Value Ref Range    Trichomonas Absent Absent    Yeast Absent Absent    Clue Cells Absent Absent    WBCs/high power field 1+ (A) None                   "

## 2024-01-08 LAB
HIV 1+2 AB+HIV1 P24 AG SERPL QL IA: NONREACTIVE
T PALLIDUM AB SER QL: NONREACTIVE

## 2024-01-09 LAB
C TRACH DNA SPEC QL PROBE+SIG AMP: NEGATIVE
N GONORRHOEA DNA SPEC QL NAA+PROBE: NEGATIVE

## 2024-02-06 DIAGNOSIS — F32.0 MILD MAJOR DEPRESSION, SINGLE EPISODE (H): ICD-10-CM

## 2024-02-06 DIAGNOSIS — F41.1 GAD (GENERALIZED ANXIETY DISORDER): ICD-10-CM

## 2024-02-06 NOTE — TELEPHONE ENCOUNTER
Last visit with me and mood was stable May 2023  Hasn't seen PCP since 2021  Let her know she needs to schedule a visit with her PCP to continue getting meds filled-route back when scheduled.  Nevaeh Barnes, APRN, CNP

## 2024-02-13 RX ORDER — ESCITALOPRAM OXALATE 5 MG/1
5 TABLET ORAL DAILY
Qty: 90 TABLET | Refills: 3 | OUTPATIENT
Start: 2024-02-13

## 2024-02-14 ENCOUNTER — OFFICE VISIT (OUTPATIENT)
Dept: URGENT CARE | Facility: URGENT CARE | Age: 19
End: 2024-02-14
Payer: COMMERCIAL

## 2024-02-14 VITALS
DIASTOLIC BLOOD PRESSURE: 72 MMHG | SYSTOLIC BLOOD PRESSURE: 122 MMHG | HEART RATE: 113 BPM | TEMPERATURE: 99 F | OXYGEN SATURATION: 99 % | BODY MASS INDEX: 23.71 KG/M2 | RESPIRATION RATE: 14 BRPM | WEIGHT: 170 LBS

## 2024-02-14 DIAGNOSIS — R30.0 DYSURIA: ICD-10-CM

## 2024-02-14 DIAGNOSIS — R50.9 FEVER, UNSPECIFIED FEVER CAUSE: Primary | ICD-10-CM

## 2024-02-14 LAB
ALBUMIN UR-MCNC: 30 MG/DL
APPEARANCE UR: CLEAR
BACTERIA #/AREA URNS HPF: ABNORMAL /HPF
BASOPHILS # BLD AUTO: 0.1 10E3/UL (ref 0–0.2)
BASOPHILS NFR BLD AUTO: 1 %
BILIRUB UR QL STRIP: ABNORMAL
COLOR UR AUTO: YELLOW
DEPRECATED S PYO AG THROAT QL EIA: NEGATIVE
EOSINOPHIL # BLD AUTO: 0 10E3/UL (ref 0–0.7)
EOSINOPHIL NFR BLD AUTO: 0 %
FLUAV AG SPEC QL IA: NEGATIVE
FLUBV AG SPEC QL IA: NEGATIVE
GLUCOSE UR STRIP-MCNC: NEGATIVE MG/DL
GROUP A STREP BY PCR: NOT DETECTED
HGB UR QL STRIP: ABNORMAL
IMM GRANULOCYTES # BLD: 0 10E3/UL
IMM GRANULOCYTES NFR BLD: 0 %
KETONES UR STRIP-MCNC: 80 MG/DL
LEUKOCYTE ESTERASE UR QL STRIP: ABNORMAL
LYMPHOCYTES # BLD AUTO: 0.9 10E3/UL (ref 0.8–5.3)
LYMPHOCYTES NFR BLD AUTO: 22 %
MONOCYTES # BLD AUTO: 0.3 10E3/UL (ref 0–1.3)
MONOCYTES NFR BLD AUTO: 8 %
MUCOUS THREADS #/AREA URNS LPF: PRESENT /LPF
NEUTROPHILS # BLD AUTO: 2.7 10E3/UL (ref 1.6–8.3)
NEUTROPHILS NFR BLD AUTO: 69 %
NITRATE UR QL: NEGATIVE
NRBC # BLD AUTO: 0 10E3/UL
NRBC BLD AUTO-RTO: 0 /100
PH UR STRIP: 6.5 [PH] (ref 5–7)
RBC #/AREA URNS AUTO: ABNORMAL /HPF
SP GR UR STRIP: 1.02 (ref 1–1.03)
SQUAMOUS #/AREA URNS AUTO: ABNORMAL /LPF
UROBILINOGEN UR STRIP-ACNC: 0.2 E.U./DL
WBC # BLD AUTO: 4 10E3/UL (ref 4–11)
WBC #/AREA URNS AUTO: ABNORMAL /HPF

## 2024-02-14 PROCEDURE — 81001 URINALYSIS AUTO W/SCOPE: CPT

## 2024-02-14 PROCEDURE — 85004 AUTOMATED DIFF WBC COUNT: CPT | Performed by: FAMILY MEDICINE

## 2024-02-14 PROCEDURE — 36415 COLL VENOUS BLD VENIPUNCTURE: CPT | Performed by: FAMILY MEDICINE

## 2024-02-14 PROCEDURE — 99214 OFFICE O/P EST MOD 30 MIN: CPT | Performed by: FAMILY MEDICINE

## 2024-02-14 PROCEDURE — 87086 URINE CULTURE/COLONY COUNT: CPT | Performed by: FAMILY MEDICINE

## 2024-02-14 PROCEDURE — 87651 STREP A DNA AMP PROBE: CPT | Performed by: FAMILY MEDICINE

## 2024-02-14 PROCEDURE — 87804 INFLUENZA ASSAY W/OPTIC: CPT | Performed by: FAMILY MEDICINE

## 2024-02-14 PROCEDURE — 85048 AUTOMATED LEUKOCYTE COUNT: CPT | Performed by: FAMILY MEDICINE

## 2024-02-14 NOTE — PROGRESS NOTES
ICD-10-CM    1. Fever, unspecified fever cause  R50.9 Streptococcus A Rapid Screen w/Reflex to PCR - Clinic Collect     Influenza A & B Antigen - Clinic Collect     Group A Streptococcus PCR Throat Swab     Urine Culture Aerobic Bacterial - lab collect     Urine Culture Aerobic Bacterial - lab collect     WBC with Diff     WBC with Diff      2. Dysuria  R30.0 UA Macroscopic with reflex to Microscopic and Culture - Clinic Collect     UA Microscopic with Reflex to Culture     Urine Culture Aerobic Bacterial - lab collect     Urine Culture Aerobic Bacterial - lab collect        Given WBC 3.9 and no left shift on differential today, I strongly suspect a viral etiology for this illness.  Too early in the illness for Monospot to be reliable, but given pt's age and symptoms, mono is definitely a possibility.  RST negative.  Rapid flu negative.  COVID rapid negative at home.    Minimal UA findings and no extra WBCs on micro.  Urine culture pending, but will hold off on antibiotics for now.      PLAN:  Patient Instructions   Based on your white count being at the low end of the normal range, it is most likely that the cause for your fever is a virus.    Today your rapid strep test was negative.  Your rapid flu test was negative.  Your urine test did not show signs of extra white blood cells, which would signify infection.  We have a urine culture in process, though, and if that shows a bacterial infection, one of my colleagues will be in touch with you to start antibiotics.  Same goes for the strep confirmation test--if that comes back positive, we'll start you on antibiotics.    For now, continue to rest and drink lots of fluids.  Continue to monitor symptoms and follow up if suddenly and severely worsening.  Try to limit your contact with others to reduce spread of illness.    SUBJECTIVE:  Bert Diaz is a 18 year old female who presents to  today with fever x 2 days, Tmax 102 this morning.  Has felt like glands  are swollen for about 3 days.  Wondering about possibility of UTI given fever and some R lower back pain.  Fever comes down with ibuprofen--last dose 8:30a today.  No new rashes.  No stomach upset.  No coughing.  Has felt a little achy in her legs, but no generalized body aches.  No known sick contacts but is a student at [employer].    OBJECTIVE:  /72 (BP Location: Right arm, Patient Position: Chair, Cuff Size: Adult Regular)   Pulse 113   Temp 99  F (37.2  C) (Oral)   Resp 14   Wt 77.1 kg (170 lb)   LMP 02/04/2024 (Approximate)   SpO2 99%   BMI 23.71 kg/m    GEN: well-appearing, in NAD  ENT: TMs normal, oral MMM, pharynx minimally erythematous, no exudates, uvula midline  Neck: few slightly enlarged anterior cervical nodes, no posterior adenopathy noted  Lungs:  CTAB, good air entry bilaterally  CV:  RRR, no murmur  Back: mild tenderness in the R lumbar paraspinal muscle without significant spasm, no CVA tenderness      Results for orders placed or performed in visit on 02/14/24   UA Macroscopic with reflex to Microscopic and Culture - Clinic Collect     Status: Abnormal    Specimen: Urine, Midstream   Result Value Ref Range    Color Urine Yellow Colorless, Straw, Light Yellow, Yellow    Appearance Urine Clear Clear    Glucose Urine Negative Negative mg/dL    Bilirubin Urine Small (A) Negative    Ketones Urine 80 (A) Negative mg/dL    Specific Gravity Urine 1.025 1.003 - 1.035    Blood Urine Small (A) Negative    pH Urine 6.5 5.0 - 7.0    Protein Albumin Urine 30 (A) Negative mg/dL    Urobilinogen Urine 0.2 0.2, 1.0 E.U./dL    Nitrite Urine Negative Negative    Leukocyte Esterase Urine Trace (A) Negative   UA Microscopic with Reflex to Culture     Status: Abnormal   Result Value Ref Range    Bacteria Urine Few (A) None Seen /HPF    RBC Urine 2-5 (A) 0-2 /HPF /HPF    WBC Urine 0-5 0-5 /HPF /HPF    Squamous Epithelials Urine Few (A) None Seen /LPF    Mucus Urine Present (A) None Seen /LPF     Narrative    Urine Culture not indicated   WBC and Differential     Status: Abnormal (Preliminary result)   Result Value Ref Range    WBC Count 3.9 (L) 4.0 - 11.0 10e3/uL    % Neutrophils 68 %    % Lymphocytes 23 %    % Monocytes 9 %    % Eosinophils 0 %    % Basophils 1 %    % Immature Granulocytes 0 %    Absolute Neutrophils 2.6 1.6 - 8.3 10e3/uL    Absolute Lymphocytes 0.9 0.8 - 5.3 10e3/uL    Absolute Monocytes 0.3 0.0 - 1.3 10e3/uL    Absolute Eosinophils 0.0 0.0 - 0.7 10e3/uL    Absolute Basophils 0.0 0.0 - 0.2 10e3/uL    Absolute Immature Granulocytes 0.0 <=0.4 10e3/uL    Narrative    Tech Comments  Name of Tech Sabba B  Laboratory Phone [phone]  What is Abnormal wbc  Provider Follow Up Needed No  If Yes, Provider Contact Name NA  If Yes, Provider Phone/Pager NA         Streptococcus A Rapid Screen w/Reflex to PCR - Clinic Collect     Status: Normal    Specimen: Throat; Swab   Result Value Ref Range    Group A Strep antigen Negative Negative   Influenza A & B Antigen - Clinic Collect     Status: Normal    Specimen: Nose; Swab   Result Value Ref Range    Influenza A antigen Negative Negative    Influenza B antigen Negative Negative    Narrative    Test results must be correlated with clinical data. If necessary, results should be confirmed by a molecular assay or viral culture.   WBC with Diff     Status: Abnormal (In process)    Narrative    The following orders were created for panel order WBC with Diff.  Procedure                               Abnormality         Status                     ---------                               -----------         ------                     WBC and Differential[[id number]]         Abnormal            Preliminary result           Please view results for these tests on the individual orders.

## 2024-02-14 NOTE — PATIENT INSTRUCTIONS
Based on your white count being at the low end of the normal range, it is most likely that the cause for your fever is a virus.    Today your rapid strep test was negative.  Your rapid flu test was negative.  Your urine test did not show signs of extra white blood cells, which would signify infection.  We have a urine culture in process, though, and if that shows a bacterial infection, one of my colleagues will be in touch with you to start antibiotics.  Same goes for the strep confirmation test--if that comes back positive, we'll start you on antibiotics.    For now, continue to rest and drink lots of fluids.  Continue to monitor symptoms and follow up if suddenly and severely worsening.  Try to limit your contact with others to reduce spread of illness.

## 2024-02-15 LAB — BACTERIA UR CULT: NORMAL

## 2024-02-26 ENCOUNTER — OFFICE VISIT (OUTPATIENT)
Dept: URGENT CARE | Facility: URGENT CARE | Age: 19
End: 2024-02-26
Payer: COMMERCIAL

## 2024-02-26 VITALS
WEIGHT: 170 LBS | TEMPERATURE: 98.8 F | BODY MASS INDEX: 23.8 KG/M2 | DIASTOLIC BLOOD PRESSURE: 89 MMHG | HEART RATE: 117 BPM | SYSTOLIC BLOOD PRESSURE: 122 MMHG | OXYGEN SATURATION: 97 % | HEIGHT: 71 IN

## 2024-02-26 DIAGNOSIS — N94.9 VAGINAL DISCOMFORT: Primary | ICD-10-CM

## 2024-02-26 DIAGNOSIS — R30.0 DYSURIA: ICD-10-CM

## 2024-02-26 LAB
ALBUMIN UR-MCNC: NEGATIVE MG/DL
APPEARANCE UR: CLEAR
BACTERIA #/AREA URNS HPF: ABNORMAL /HPF
BILIRUB UR QL STRIP: NEGATIVE
CLUE CELLS: ABNORMAL
COLOR UR AUTO: YELLOW
GLUCOSE UR STRIP-MCNC: NEGATIVE MG/DL
HGB UR QL STRIP: ABNORMAL
KETONES UR STRIP-MCNC: NEGATIVE MG/DL
LEUKOCYTE ESTERASE UR QL STRIP: NEGATIVE
NITRATE UR QL: NEGATIVE
PH UR STRIP: 6.5 [PH] (ref 5–7)
RBC #/AREA URNS AUTO: ABNORMAL /HPF
SP GR UR STRIP: 1.01 (ref 1–1.03)
TRICHOMONAS, WET PREP: ABNORMAL
UROBILINOGEN UR STRIP-ACNC: 0.2 E.U./DL
WBC #/AREA URNS AUTO: ABNORMAL /HPF
WBC'S/HIGH POWER FIELD, WET PREP: ABNORMAL
YEAST, WET PREP: ABNORMAL

## 2024-02-26 PROCEDURE — 87210 SMEAR WET MOUNT SALINE/INK: CPT | Performed by: FAMILY MEDICINE

## 2024-02-26 PROCEDURE — 87591 N.GONORRHOEAE DNA AMP PROB: CPT | Performed by: FAMILY MEDICINE

## 2024-02-26 PROCEDURE — 99213 OFFICE O/P EST LOW 20 MIN: CPT | Performed by: FAMILY MEDICINE

## 2024-02-26 PROCEDURE — 87491 CHLMYD TRACH DNA AMP PROBE: CPT | Performed by: FAMILY MEDICINE

## 2024-02-26 PROCEDURE — 81001 URINALYSIS AUTO W/SCOPE: CPT | Performed by: FAMILY MEDICINE

## 2024-02-27 LAB
C TRACH DNA SPEC QL PROBE+SIG AMP: NEGATIVE
N GONORRHOEA DNA SPEC QL NAA+PROBE: NEGATIVE

## 2024-02-27 NOTE — PATIENT INSTRUCTIONS
Follow up if  symptoms fail to improve or worsens   Pt understood and agreed with plan       Shellie Garcia MD

## 2024-04-06 DIAGNOSIS — Z30.09 GENERAL COUNSELING FOR PRESCRIPTION OF ORAL CONTRACEPTIVES: ICD-10-CM

## 2024-04-08 RX ORDER — DESOGESTREL AND ETHINYL ESTRADIOL 0.15-0.03
KIT ORAL
Qty: 84 TABLET | Refills: 1 | Status: SHIPPED | OUTPATIENT
Start: 2024-04-08 | End: 2024-05-02

## 2024-04-29 SDOH — HEALTH STABILITY: PHYSICAL HEALTH: ON AVERAGE, HOW MANY DAYS PER WEEK DO YOU ENGAGE IN MODERATE TO STRENUOUS EXERCISE (LIKE A BRISK WALK)?: 6 DAYS

## 2024-04-29 SDOH — HEALTH STABILITY: PHYSICAL HEALTH: ON AVERAGE, HOW MANY MINUTES DO YOU ENGAGE IN EXERCISE AT THIS LEVEL?: 60 MIN

## 2024-04-29 ASSESSMENT — SOCIAL DETERMINANTS OF HEALTH (SDOH): HOW OFTEN DO YOU GET TOGETHER WITH FRIENDS OR RELATIVES?: MORE THAN THREE TIMES A WEEK

## 2024-05-02 ENCOUNTER — OFFICE VISIT (OUTPATIENT)
Dept: PEDIATRICS | Facility: CLINIC | Age: 19
End: 2024-05-02
Payer: COMMERCIAL

## 2024-05-02 VITALS
DIASTOLIC BLOOD PRESSURE: 72 MMHG | HEART RATE: 92 BPM | HEIGHT: 71 IN | WEIGHT: 175.1 LBS | SYSTOLIC BLOOD PRESSURE: 112 MMHG | BODY MASS INDEX: 24.51 KG/M2 | TEMPERATURE: 98.7 F | OXYGEN SATURATION: 99 % | RESPIRATION RATE: 16 BRPM

## 2024-05-02 DIAGNOSIS — F32.0 MILD MAJOR DEPRESSION, SINGLE EPISODE (H): ICD-10-CM

## 2024-05-02 DIAGNOSIS — Z30.09 GENERAL COUNSELING FOR PRESCRIPTION OF ORAL CONTRACEPTIVES: ICD-10-CM

## 2024-05-02 DIAGNOSIS — F41.1 GAD (GENERALIZED ANXIETY DISORDER): ICD-10-CM

## 2024-05-02 DIAGNOSIS — Z00.00 ROUTINE GENERAL MEDICAL EXAMINATION AT A HEALTH CARE FACILITY: Primary | ICD-10-CM

## 2024-05-02 PROCEDURE — 80053 COMPREHEN METABOLIC PANEL: CPT | Performed by: INTERNAL MEDICINE

## 2024-05-02 PROCEDURE — 80061 LIPID PANEL: CPT | Performed by: INTERNAL MEDICINE

## 2024-05-02 PROCEDURE — 36415 COLL VENOUS BLD VENIPUNCTURE: CPT | Performed by: INTERNAL MEDICINE

## 2024-05-02 PROCEDURE — 90480 ADMN SARSCOV2 VAC 1/ONLY CMP: CPT | Performed by: INTERNAL MEDICINE

## 2024-05-02 PROCEDURE — 91320 SARSCV2 VAC 30MCG TRS-SUC IM: CPT | Performed by: INTERNAL MEDICINE

## 2024-05-02 PROCEDURE — 99395 PREV VISIT EST AGE 18-39: CPT | Mod: 25 | Performed by: INTERNAL MEDICINE

## 2024-05-02 RX ORDER — DESOGESTREL AND ETHINYL ESTRADIOL 0.15-0.03
1 KIT ORAL DAILY
Qty: 84 TABLET | Refills: 1 | Status: SHIPPED | OUTPATIENT
Start: 2024-05-02

## 2024-05-02 RX ORDER — ESCITALOPRAM OXALATE 5 MG/1
5 TABLET ORAL DAILY
Qty: 90 TABLET | Refills: 3 | Status: SHIPPED | OUTPATIENT
Start: 2024-05-02

## 2024-05-02 ASSESSMENT — PAIN SCALES - GENERAL: PAINLEVEL: NO PAIN (0)

## 2024-05-02 NOTE — PROGRESS NOTES
Preventive Care Visit  [facility] JORGE Fox MD, Internal Medicine  May 2, 2024      Assessment & Plan     (Z00.00) Routine general medical examination at a health care facility  (primary encounter diagnosis)  Comment: healthy habits. Doing well.  Plan: Lipid panel reflex to direct LDL Non-fasting,         Comprehensive metabolic panel (BMP + Alb, Alk         Phos, ALT, AST, Total. Bili, TP)            (Z30.09) General counseling for prescription of oral contraceptives  Comment: does well with this method, does not wish to change.  Plan: desogestrel-ethinyl estradiol (ISIBLOOM)         0.15-30 MG-MCG tablet            (F32.0) Mild major depression, single episode (H24)  Comment: stable, doing well. Also in therapy.   Plan: escitalopram (LEXAPRO) 5 MG tablet            (F41.1) APPLE (generalized anxiety disorder)  Comment: stable  Plan: escitalopram (LEXAPRO) 5 MG tablet                      Counseling  Appropriate preventive services were discussed with this patient, including applicable screening as appropriate for fall prevention, nutrition, physical activity, Tobacco-use cessation, weight loss and cognition.  Checklist reviewing preventive services available has been given to the patient.  Reviewed patient's diet, addressing concerns and/or questions.           Eva Noel is a 18 year old, presenting for the following:  Physical        5/2/2024     1:03 PM   Additional Questions   Roomed by Jaki   Accompanied by none         5/2/2024     1:03 PM   Patient Reported Additional Medications   Patient reports taking the following new medications none        Health Care Directive  Patient does not have a Health Care Directive or Living Will: Discussed advance care planning with patient; information given to patient to review.    LESLIE Noel is here for a preventive health visit.  Overall, she is doing well with the following concerns:      OCP working well, regulates period well.    Escitalopram - 5 mg x4 years. Initiated for mix of anxiety and depression. Does therapy through college.   Exercise - goes to the gym regularly. Does weight lifting and also cardio.           4/29/2024   General Health   How would you rate your overall physical health? Excellent   Feel stress (tense, anxious, or unable to sleep) To some extent   (!) STRESS CONCERN      4/29/2024   Nutrition   Three or more servings of calcium each day? Yes   Diet: Regular (no restrictions)   How many servings of fruit and vegetables per day? (!) 2-3   How many sweetened beverages each day? 0-1         4/29/2024   Exercise   Days per week of moderate/strenous exercise 6 days   Average minutes spent exercising at this level 60 min         4/29/2024   Social Factors   Frequency of gathering with friends or relatives More than three times a week   Worry food won't last until get money to buy more No   Food not last or not have enough money for food? No   Do you have housing?  Yes   Are you worried about losing your housing? No   Lack of transportation? No   Unable to get utilities (heat,electricity)? No         4/29/2024   Dental   Dentist two times every year? Yes         4/29/2024   TB Screening   Were you born outside of the [place]? No       Today's PHQ-9 Score:       5/2/2024    12:59 PM   PHQ-9 SCORE   PHQ-9 Total Score MyChart 3 (Minimal depression)   PHQ-9 Total Score 3         4/29/2024   Substance Use   Alcohol more than 3/day or more than 7/wk No   Do you use any other substances recreationally? No     Social History     Tobacco Use    Smoking status: Never    Smokeless tobacco: Never    Tobacco comments:     Non-smoking home   Vaping Use    Vaping status: Never Used   Substance Use Topics    Alcohol use: No    Drug use: No           4/29/2024   STI Screening   New sexual partner(s) since last STI/HIV test? No     History of abnormal Pap smear: NO - under age 21, PAP not appropriate for age             4/29/2024  "  Contraception/Family Planning   Questions about contraception or family planning No        Reviewed and updated as needed this visit by Provider   Tobacco  Allergies  Meds  Problems  Med Hx  Surg Hx  Fam Hx                     Objective    Exam  /72 (BP Location: Right arm, Patient Position: Sitting, Cuff Size: Adult Regular)   Pulse 92   Temp 98.7  F (37.1  C) (Tympanic)   Resp 16   Ht 1.791 m (5' 10.5\")   Wt 79.4 kg (175 lb 1.6 oz)   LMP 04/01/2024 (Exact Date)   SpO2 99%   BMI 24.77 kg/m     Estimated body mass index is 24.77 kg/m  as calculated from the following:    Height as of this encounter: 1.791 m (5' 10.5\").    Weight as of this encounter: 79.4 kg (175 lb 1.6 oz).    Physical Exam  GENERAL: alert and no distress  EYES: Eyes grossly normal to inspection, PERRL and conjunctivae and sclerae normal  HENT: ear canals and TM's normal, nose and mouth without ulcers or lesions  NECK: no adenopathy, no asymmetry, masses, or scars  RESP: lungs clear to auscultation - no rales, rhonchi or wheezes  CV: regular rate and rhythm, normal S1 S2, no S3 or S4, no murmur, click or rub, no peripheral edema  MS: no gross musculoskeletal defects noted, no edema  SKIN: no suspicious lesions or rashes  NEURO: Normal strength and tone, mentation intact and speech normal  PSYCH: mentation appears normal, affect normal/bright  : Exam declined by parent/patient.  Reason for decline: Patient/Parental preference      Vision Screen  Vision Screen Details  Reason Vision Screen Not Completed: Patient had exam in last 12 months    Hearing Screen  RIGHT EAR  1000 Hz on Level 40 dB (Conditioning sound): Pass  1000 Hz on Level 20 dB: Pass  2000 Hz on Level 20 dB: Pass  4000 Hz on Level 20 dB: Pass  6000 Hz on Level 20 dB: Pass  8000 Hz on Level 20 dB: Pass  LEFT EAR  8000 Hz on Level 20 dB: Pass  6000 Hz on Level 20 dB: Pass  4000 Hz on Level 20 dB: Pass  2000 Hz on Level 20 dB: Pass  1000 Hz on Level 20 dB: Pass  500 " Hz on Level 25 dB: Pass  RIGHT EAR  500 Hz on Level 25 dB: Pass  Results  Hearing Screen Results: Pass        Signed Electronically by: Josselyn Fox MD

## 2024-05-02 NOTE — PATIENT INSTRUCTIONS
[employer] is a good website for good information on contraception.  Preventive Care Advice   This is general advice given by our system to help you stay healthy. However, your care team may have specific advice just for you. Please talk to your care team about your preventive care needs.  Nutrition  Eat 5 or more servings of fruits and vegetables each day.  Try wheat bread, brown rice and whole grain pasta (instead of white bread, rice, and pasta).  Get enough calcium and vitamin D. Check the label on foods and aim for 100% of the RDA (recommended daily allowance).  Lifestyle  Exercise at least 150 minutes each week   (30 minutes a day, 5 days a week).  Do muscle strengthening activities 2 days a week. These help control your weight and prevent disease.  No smoking.  Wear sunscreen to prevent skin cancer.  Have a dental exam and cleaning every 6 months.  Yearly exams  See your health care team every year to talk about:  Any changes in your health.  Any medicines your care team has prescribed.  Preventive care, family planning, and ways to prevent chronic diseases.  Shots (vaccines)   HPV shots (up to age 26), if you've never had them before.  Hepatitis B shots (up to age 59), if you've never had them before.  COVID-19 shot: Get this shot when it's due.  Flu shot: Get a flu shot every year.  Tetanus shot: Get a tetanus shot every 10 years.  Pneumococcal, hepatitis A, and RSV shots: Ask your care team if you need these based on your risk.  Shingles shot (for age 50 and up).  General health tests  Diabetes screening:  Starting at age 35, Get screened for diabetes at least every 3 years.  If you are younger than age 35, ask your care team if you should be screened for diabetes.  Cholesterol test: At age 39, start having a cholesterol test every 5 years, or more often if advised.  Bone density scan (DEXA): At age 50, ask your care team if you should have this scan for osteoporosis (brittle bones).  Hepatitis C: Get  tested at least once in your life.  STIs (sexually transmitted infections)  Before age 24: Ask your care team if you should be screened for STIs.  After age 24: Get screened for STIs if you're at risk. You are at risk for STIs (including HIV) if:  You are sexually active with more than one person.  You don't use condoms every time.  You or a partner was diagnosed with a sexually transmitted infection.  If you are at risk for HIV, ask about PrEP medicine to prevent HIV.  Get tested for HIV at least once in your life, whether you are at risk for HIV or not.  Cancer screening tests  Cervical cancer screening: If you have a cervix, begin getting regular cervical cancer screening tests at age 21. Most people who have regular screenings with normal results can stop after age 65. Talk about this with your provider.  Breast cancer scan (mammogram): If you've ever had breasts, begin having regular mammograms starting at age 40. This is a scan to check for breast cancer.  Colon cancer screening: It is important to start screening for colon cancer at age 45.  Have a colonoscopy test every 10 years (or more often if you're at risk) Or, ask your provider about stool tests like a FIT test every year or Cologuard test every 3 years.  To learn more about your testing options, visit: https://www.mktg/973071.pdf.  For help making a decision, visit: https://bit.ly/ti24439.  Prostate cancer screening test: If you have a prostate and are age 55 to 69, ask your provider if you would benefit from a yearly prostate cancer screening test.  Lung cancer screening: If you are a current or former smoker age 50 to 80, ask your care team if ongoing lung cancer screenings are right for you.  For informational purposes only. Not to replace the advice of your health care provider. Copyright   2023 [employer]. All rights reserved. Clinically reviewed by the [facility]. [employer] 249778 - REV  01/24.    Learning About Stress  What is stress?     Stress is your body's response to a hard situation. Your body can have a physical, emotional, or mental response. Stress is a fact of life for most people, and it affects everyone differently. What causes stress for you may not be stressful for someone else.  A lot of things can cause stress. You may feel stress when you go on a job interview, take a test, or run a race. This kind of short-term stress is normal and even useful. It can help you if you need to work hard or react quickly. For example, stress can help you finish an important job on time.  Long-term stress is caused by ongoing stressful situations or events. Examples of long-term stress include long-term health problems, ongoing problems at work, or conflicts in your family. Long-term stress can harm your health.  How does stress affect your health?  When you are stressed, your body responds as though you are in danger. It makes hormones that speed up your heart, make you breathe faster, and give you a burst of energy. This is called the fight-or-flight stress response. If the stress is over quickly, your body goes back to normal and no harm is done.  But if stress happens too often or lasts too long, it can have bad effects. Long-term stress can make you more likely to get sick, and it can make symptoms of some diseases worse. If you tense up when you are stressed, you may develop neck, shoulder, or low back pain. Stress is linked to high blood pressure and heart disease.  Stress also harms your emotional health. It can make you ruelas, tense, or depressed. Your relationships may suffer, and you may not do well at work or school.  What can you do to manage stress?  You can try these things to help manage stress:   Do something active. Exercise or activity can help reduce stress. Walking is a great way to get started. Even everyday activities such as housecleaning or yard work can help.  Try yoga or gary  chi. These techniques combine exercise and meditation. You may need some training at first to learn them.  Do something you enjoy. For example, listen to music or go to a movie. Practice your hobby or do volunteer work.  Meditate. This can help you relax, because you are not worrying about what happened before or what may happen in the future.  Do guided imagery. Imagine yourself in any setting that helps you feel calm. You can use online videos, books, or a teacher to guide you.  Do breathing exercises. For example:  From a standing position, bend forward from the waist with your knees slightly bent. Let your arms dangle close to the floor.  Breathe in slowly and deeply as you return to a standing position. Roll up slowly and lift your head last.  Hold your breath for just a few seconds in the standing position.  Breathe out slowly and bend forward from the waist.  Let your feelings out. Talk, laugh, cry, and express anger when you need to. Talking with supportive friends or family, a counselor, or a morgan leader about your feelings is a healthy way to relieve stress. Avoid discussing your feelings with people who make you feel worse.  Write. It may help to write about things that are bothering you. This helps you find out how much stress you feel and what is causing it. When you know this, you can find better ways to cope.  What can you do to prevent stress?  You might try some of these things to help prevent stress:  Manage your time. This helps you find time to do the things you want and need to do.  Get enough sleep. Your body recovers from the stresses of the day while you are sleeping.  Get support. Your family, friends, and community can make a difference in how you experience stress.  Limit your news feed. Avoid or limit time on social media or news that may make you feel stressed.  Do something active. Exercise or activity can help reduce stress. Walking is a great way to get started.  Where can you learn  "more?  Go to https://www.[employer].net/patiented  Enter N032 in the search box to learn more about \"Learning About Stress.\"  Current as of: October 24, 2023               Content Version: 14.0    9830-7771 [employer].   Care instructions adapted under license by your healthcare professional. If you have questions about a medical condition or this instruction, always ask your healthcare professional. [employer] disclaims any warranty or liability for your use of this information.      "

## 2024-05-03 LAB
ALBUMIN SERPL BCG-MCNC: 4.3 G/DL (ref 3.5–5.2)
ALP SERPL-CCNC: 42 U/L (ref 40–150)
ALT SERPL W P-5'-P-CCNC: 14 U/L (ref 0–50)
ANION GAP SERPL CALCULATED.3IONS-SCNC: 9 MMOL/L (ref 7–15)
AST SERPL W P-5'-P-CCNC: 18 U/L (ref 0–45)
BILIRUB SERPL-MCNC: 0.2 MG/DL
BUN SERPL-MCNC: 11.8 MG/DL (ref 6–20)
CALCIUM SERPL-MCNC: 8.9 MG/DL (ref 8.6–10)
CHLORIDE SERPL-SCNC: 104 MMOL/L (ref 98–107)
CHOLEST SERPL-MCNC: 155 MG/DL
CREAT SERPL-MCNC: 0.91 MG/DL (ref 0.51–0.95)
DEPRECATED HCO3 PLAS-SCNC: 25 MMOL/L (ref 22–29)
EGFRCR SERPLBLD CKD-EPI 2021: >90 ML/MIN/1.73M2
FASTING STATUS PATIENT QL REPORTED: NO
GLUCOSE SERPL-MCNC: 83 MG/DL (ref 70–99)
HDLC SERPL-MCNC: 66 MG/DL
LDLC SERPL CALC-MCNC: 64 MG/DL
NONHDLC SERPL-MCNC: 89 MG/DL
POTASSIUM SERPL-SCNC: 3.8 MMOL/L (ref 3.4–5.3)
PROT SERPL-MCNC: 7 G/DL (ref 6.4–8.3)
SODIUM SERPL-SCNC: 138 MMOL/L (ref 135–145)
TRIGL SERPL-MCNC: 125 MG/DL

## 2024-09-23 ENCOUNTER — OFFICE VISIT (OUTPATIENT)
Dept: PLASTIC SURGERY | Facility: CLINIC | Age: 19
End: 2024-09-23
Payer: COMMERCIAL

## 2024-09-23 DIAGNOSIS — J34.89 NASAL LESION: Primary | ICD-10-CM

## 2024-09-23 PROCEDURE — 88305 TISSUE EXAM BY PATHOLOGIST: CPT | Mod: TC | Performed by: STUDENT IN AN ORGANIZED HEALTH CARE EDUCATION/TRAINING PROGRAM

## 2024-09-23 NOTE — PROGRESS NOTES
Facial Plastic and Reconstructive Surgery Consultation  09/23/24     HPI:   I had the pleasure of seeing Bert Diaz today in clinic for consultation for a nasal lesion.      Bert Diaz is a 18 year old female. She reports that she had her nose pierced in 2020. Shortly after noticed a small growth but more recently it has really grown. She noticed it when she switched from a stud to a ring. It does bleed when it is bumped. She took out her piercing this morning. It has never been injected, removed or biopsied. She has a hypertrophic scar on her shoulder. She has a lot of other ear piercings that are not an issue.       PE:  Alert and Oriented, Answering Questions Appropriately  Atraumatic, Normocephalic, Face Symmetric  Skin: Perez 2  Facial Nerve Intact and facial movement symmetric  There is a papillomatous growth of the right alar groove. It is [id number].     PMHx: None  PShx: shoulder/labrum repair  Medications: birth control, escitalopram  Allergies: Azithromycin  Smoking/vaping: never     PROCEDURE:     Surgeon: Dr. Patricia Larson MD    PROCEDURE:   Shave biopsy of concerning cutaneous lesion, location: right nasal ala    Indication: Cutaneous lesion with concerning characteristics of growth and shape    Anesthesia: Local (2% lidocaine with 1:100,000 epinephrine)    Complications: None    Specimen:   -Right nasal lesion    Findings:   -Size of lesion: [id number]  -Size of defect: 3mm     Procedure performed:  Written consent was obtained. The area of the concerning lesion was prepped and draped in the sterile fashion. The lesion was appropriately marked. 1ml of 1%lidocaine with 1:100,000 epinephrine was injected. A 15 blade scalpel used for shave excision. Hemostasis obtained. Specimen sent for permanent.    Patient tolerated the procedure well with no complications.             IMPRESSION/PLAN: Bert Diaz is a 18 year old female here today in consult for a nasal lesion at the site of a nose  piercing. It appeared papillomatous and less like a keloid. Therefore, the decision was made to biopsy. Differential includes a wart or a pyogenic granuloma. A shave biopsy was recommended and performed today.  Risks and benefits of the procedure were discussed, including but not limited to motor/sensory nerve damage (temporary and permanent), scarring, hematoma, irregularities of skin and underlying soft tissue, infection, asymmetry, and the need for additional procedures. Based on those findings, we will determine next steps. I will call her when those results return.     Photodocumentation was obtained.

## 2024-09-25 NOTE — NURSING NOTE
Photo documentation obtained.     Informed consent obtained.    Adina Javier RN  09/25/24 12:37 PM

## 2024-09-29 LAB
PATH REPORT.COMMENTS IMP SPEC: NORMAL
PATH REPORT.COMMENTS IMP SPEC: NORMAL
PATH REPORT.FINAL DX SPEC: NORMAL
PATH REPORT.GROSS SPEC: NORMAL
PATH REPORT.MICROSCOPIC SPEC OTHER STN: NORMAL
PATH REPORT.RELEVANT HX SPEC: NORMAL
PHOTO IMAGE: NORMAL

## 2024-09-29 PROCEDURE — 88312 SPECIAL STAINS GROUP 1: CPT | Mod: 26

## 2024-09-29 PROCEDURE — 88305 TISSUE EXAM BY PATHOLOGIST: CPT | Mod: 26

## 2024-10-01 ENCOUNTER — TELEPHONE (OUTPATIENT)
Dept: PLASTIC SURGERY | Facility: CLINIC | Age: 19
End: 2024-10-01

## 2024-10-01 NOTE — TELEPHONE ENCOUNTER
Telephone call    I attempted to call Bert Diaz to discuss pathology results as follows:     Final Diagnosis   Skin, right nare, shave biopsy:  -Papillomatous squamous epithelium, inflamed; benign.     I would like to see her back in 1 month, sooner if there are issues. Given that this was not a complete excision it may recur and further treatment may be needed.     I left her a message to call the clinic back to discuss results.     Patricia Larson MD

## 2024-10-20 DIAGNOSIS — Z30.09 GENERAL COUNSELING FOR PRESCRIPTION OF ORAL CONTRACEPTIVES: ICD-10-CM

## 2024-10-21 RX ORDER — DESOGESTREL AND ETHINYL ESTRADIOL 0.15-0.03
1 KIT ORAL DAILY
Qty: 84 TABLET | Refills: 1 | Status: SHIPPED | OUTPATIENT
Start: 2024-10-21

## 2024-10-29 ENCOUNTER — OFFICE VISIT (OUTPATIENT)
Dept: PLASTIC SURGERY | Facility: CLINIC | Age: 19
End: 2024-10-29
Payer: COMMERCIAL

## 2024-10-29 DIAGNOSIS — J34.89 NASAL LESION: Primary | ICD-10-CM

## 2024-10-29 NOTE — PROGRESS NOTES
Facial Plastic and Reconstructive Surgery      Bert Diaz is s/p shave excision of nasal lesion on 9/23/24 with pathology returning as papillomatous squamous epithelium.     Today, she reports she is doing well. The lesion has resolved. She has been using aquaphor. She has no concerns.     On exam, she has healed beautifully. There is no residual lesion.     A/P: She is now about 1 month s/p shave excision of nasal lesion. She has healed very nicely. She can stop the aquaphor. If she needs anything in the future she can certainly come back to see me, otherwise no specific follow up is needed.     Patricia Larson MD

## 2025-01-20 ENCOUNTER — OFFICE VISIT (OUTPATIENT)
Dept: NEUROLOGY | Facility: CLINIC | Age: 20
End: 2025-01-20

## 2025-01-20 ENCOUNTER — TELEPHONE (OUTPATIENT)
Dept: NEUROLOGY | Facility: CLINIC | Age: 20
End: 2025-01-20

## 2025-01-20 VITALS — SYSTOLIC BLOOD PRESSURE: 130 MMHG | HEART RATE: 79 BPM | DIASTOLIC BLOOD PRESSURE: 80 MMHG

## 2025-01-20 DIAGNOSIS — G57.11 MERALGIA PARESTHETICA, RIGHT LOWER LIMB: Primary | ICD-10-CM

## 2025-01-20 NOTE — PROGRESS NOTES
[facility]   Headache Neurology Consult  January 20, 2025     Bert Diaz MRN# [id number]   YOB: 2005 Age: 19 year old     Requesting provider: Not on file         Assessment and Recommendations:     Bert Diaz is a 19 year old female who presents for acute onset sensory loss in the RIGHT lateral femoral cutaneous nerve distribution pattern most consistent with a diagnosis of meralgia paresthetica.   Interestingly, she has not endorsed a history of recent compression which could have contributed, but it was present on awakening, lying on the right side and presumably could have occurred from localized compression against a harder surface under the mattress.   She has noted the beginnings of improvement where yesterday she had 100% sensory loss, today it has improved to 50%.  She has no family history of compressive nerve palsies, no other red flags and did order an EMG for further assessment for timing 2 weeks from the present. She can schedule this now and only choose to proceed with it if needed at that time.   Would anticipate an excellent prognosis if it continues on the current trajectory.    We have an ordered return visit in 1 month, but she may cancel it if not needed.    Total time spent today was 61 minutes in chart review, history, exam and counseling.      Manasa Terrell MD  Neurology            Chief Complaint:     Chief Complaint   Patient presents with    Consult For           History is obtained from the patient and medical record.      Bert Diaz is a 19 year old female who yesterday morning noted that she was lying on the right side and upon awakening noted that she had right lateral thigh numbness and was wearing looser pajama pants. She noted this was a loss of sensation and that there was minimal if any progression to the territory of that over the course of the day. It has started to improve as of this morning. Never experienced  this prior. She has had paresthesias from compression in the past and lasted less than 1 day. No episodic weakness. No trigeminal neuralgia like symptoms. No vertigo. Memory has been ok.     She has not noted any change in her ability to ambulate given the numbness.   This has not been painful at all. She was wearing loose fitting jeans the day prior so not noted any cause for compression of the lateral cutaneous nerve.   No back pain.  She was in bed for a regular period of time, no recent illness or immobility. She was laying on the right side the day previously while hanging out, but did not experience onset at that time.     She is a runner about 2 times per week, no injuries recently. No recent changes to work outs.               Past Medical History:     Past Medical History:   Diagnosis Date    Transitory tachypnea of      48 hours in special care   APPLE   No history of chicken pox          Past Surgical History:   2 bilateral shoulder surgeries (labrum repair)          Social History:     Social History     Socioeconomic History    Marital status: Single     Spouse name: Not on file    Number of children: Not on file    Years of education: Not on file    Highest education level: Not on file   Occupational History    Not on file   Tobacco Use    Smoking status: Never    Smokeless tobacco: Never    Tobacco comments:     Non-smoking home   Vaping Use    Vaping status: Never Used   Substance and Sexual Activity    Alcohol use: Occasional    Drug use: No    Sexual activity: Never     Birth control/protection: Pill   Other Topics Concern    Not on file   Social History Narrative    Not on file     Social Drivers of Health     Financial Resource Strain: Low Risk  (2024)    Financial Resource Strain     Within the past 12 months, have you or your family members you live with been unable to get utilities (heat, electricity) when it was really needed?: No   Food Insecurity: Low Risk  (2024)    Food  Insecurity     Within the past 12 months, did you worry that your food would run out before you got money to buy more?: No     Within the past 12 months, did the food you bought just not last and you didn t have money to get more?: No   Transportation Needs: Low Risk  (4/29/2024)    Transportation Needs     Within the past 12 months, has lack of transportation kept you from medical appointments, getting your medicines, non-medical meetings or appointments, work, or from getting things that you need?: No   Physical Activity: Sufficiently Active (4/29/2024)    Exercise Vital Sign     Days of Exercise per Week: 6 days     Minutes of Exercise per Session: 60 min   Stress: Stress Concern Present (4/29/2024)    [employer] [place] [employer] - Occupational Stress Questionnaire     Feeling of Stress : To some extent   Social Connections: Unknown (4/29/2024)    Social Connection and Isolation Panel [NHANES]     Frequency of Communication with Friends and Family: Not on file     Frequency of Social Gatherings with Friends and Family: More than three times a week     Attends Spiritism Services: Not on file     Active Member of Clubs or Organizations: Not on file     Attends Club or Organization Meetings: Not on file     Marital Status: Not on file   Interpersonal Safety: Low Risk  (5/2/2024)    Interpersonal Safety     Do you feel physically and emotionally safe where you currently live?: Yes     Within the past 12 months, have you been hit, slapped, kicked or otherwise physically hurt by someone?: No     Within the past 12 months, have you been humiliated or emotionally abused in other ways by your partner or ex-partner?: No   Housing Stability: Low Risk  (4/29/2024)    Housing Stability     Do you have housing? : Yes     Are you worried about losing your housing?: No             Family History:     Family History   Problem Relation Age of Onset    Asthma Brother         Exercise induced asthma    C.A.D. No family  hx of     Diabetes No family hx of     Hypertension No family hx of     Cerebrovascular Disease No family hx of     Breast Cancer No family hx of    No compression palsies, none for MS, or neuropathies          Allergies:      Allergies   Allergen Reactions    Azithromycin Dihydrate [Azithromycin]     Zithromax [Azithromycin Dihydrate]      ? Upset stomach             Medications:     Current Outpatient Medications:     desogestrel-ethinyl estradiol (ISIBLOOM) 0.15-30 MG-MCG tablet, Take 1 tablet by mouth daily. OK to use continuously, Disp: 84 tablet, Rfl: 1    escitalopram (LEXAPRO) 5 MG tablet, Take 1 tablet (5 mg) by mouth daily, Disp: 90 tablet, Rfl: 3          Physical Exam:   /80 (BP Location: Right arm, Patient Position: Sitting, Cuff Size: Adult Regular)   Pulse 79    Physical Exam: row of bruising in the posterior right thigh, within the region of numbness, laterally  Neurologic:   Mental Status Exam: Alert, awake and oriented to situation. No dysarthria. Speech of normal fluency.   Cranial Nerves: Fundoscopic exam with clear disc margins bilaterally. PERRLA, EOMs intact, no nystagmus, facial movements symmetric, facial sensation intact to light touch, hearing intact to conversation, trapezius and SCMs 5/5 bilaterally, tongue midline and fully mobile. No tongue atrophy.    Motor: Normal tone in all four extremities, no atrophy. 5/5 strength bilaterally in shoulder abduction, elbow extensors and flexors, wrist extensors and flexors, hip flexors, knee extensors and flexors, dorsi- and plantarflexion.    Sensory: Sensation intact to pinprick and vibration sensation on distal arms and legs bilaterally. Lateral sensory loss to pinprick along the distribution of the femoral cutaneous nerve, 50% reduction compared to the left side. No tinel's sign at the inguinal canal.    Coordination: Finger-nose-finger intact bilaterally.  Rapidly alternating movements intact bilaterally in the upper extremities.   Normal finger tapping bilaterally.  Intact heel-shin bilaterally.   Reflexes: 2+ and symmetric in triceps, biceps, brachioradialis, patellar, Achilles, and plantars downgoing bilaterally.   Gait: Normal gait.  Able to toe and heel walk.  Tandem gait normal.  Eyes: No conjunctival injection, no scleral icterus.           Data:     None available, glucose normal as of 5/2/2024

## 2025-01-20 NOTE — TELEPHONE ENCOUNTER
Called and left message with patient to schedule an appt today with Dr. Terrell.      I left on the message that I would make the appt at 12:30, and requested the patient call back to either confirm the appt or decline the appt time.    Marquis Bernardo LAT ATC on 1/20/2025 at 8:30 AM

## 2025-01-20 NOTE — LETTER
1/20/2025      Bert Diaz  [address] [facility] [id number]      Dear Colleague,    Thank you for referring your patient, Bert Diaz, to the [facility]UR[facility]. Please see a copy of my visit note below.    [facility]   Headache Neurology Consult  January 20, 2025     Bert Diaz MRN# [id number]   YOB: 2005 Age: 19 year old     Requesting provider: Not on file         Assessment and Recommendations:     Bert Diaz is a 19 year old female who presents for acute onset sensory loss in the RIGHT lateral femoral cutaneous nerve distribution pattern most consistent with a diagnosis of meralgia paresthetica.   Interestingly, she has not endorsed a history of recent compression which could have contributed, but it was present on awakening, lying on the right side and presumably could have occurred from localized compression against a harder surface under the mattress.   She has noted the beginnings of improvement where yesterday she had 100% sensory loss, today it has improved to 50%.  She has no family history of compressive nerve palsies, no other red flags and did order an EMG for further assessment for timing 2 weeks from the present. She can schedule this now and only choose to proceed with it if needed at that time.   Would anticipate an excellent prognosis if it continues on the current trajectory.    We have an ordered return visit in 1 month, but she may cancel it if not needed.    Total time spent today was 61 minutes in chart review, history, exam and counseling.      Manasa Terrell MD  Neurology            Chief Complaint:     Chief Complaint   Patient presents with     Consult For           History is obtained from the patient and medical record.      Bert Diaz is a 19 year old female who yesterday morning noted that she was lying on the right side and upon awakening noted that  she had right lateral thigh numbness and was wearing looser pajama pants. She noted this was a loss of sensation and that there was minimal if any progression to the territory of that over the course of the day. It has started to improve as of this morning. Never experienced this prior. She has had paresthesias from compression in the past and lasted less than 1 day. No episodic weakness. No trigeminal neuralgia like symptoms. No vertigo. Memory has been ok.     She has not noted any change in her ability to ambulate given the numbness.   This has not been painful at all. She was wearing loose fitting jeans the day prior so not noted any cause for compression of the lateral cutaneous nerve.   No back pain.  She was in bed for a regular period of time, no recent illness or immobility. She was laying on the right side the day previously while hanging out, but did not experience onset at that time.     She is a runner about 2 times per week, no injuries recently. No recent changes to work outs.               Past Medical History:     Past Medical History:   Diagnosis Date     Transitory tachypnea of      48 hours in special care   APPLE   No history of chicken pox          Past Surgical History:   2 bilateral shoulder surgeries (labrum repair)          Social History:     Social History     Socioeconomic History     Marital status: Single     Spouse name: Not on file     Number of children: Not on file     Years of education: Not on file     Highest education level: Not on file   Occupational History     Not on file   Tobacco Use     Smoking status: Never     Smokeless tobacco: Never     Tobacco comments:     Non-smoking home   Vaping Use     Vaping status: Never Used   Substance and Sexual Activity     Alcohol use: Occasional     Drug use: No     Sexual activity: Never     Birth control/protection: Pill   Other Topics Concern     Not on file   Social History Narrative     Not on file     Social Drivers of Health      Financial Resource Strain: Low Risk  (4/29/2024)    Financial Resource Strain      Within the past 12 months, have you or your family members you live with been unable to get utilities (heat, electricity) when it was really needed?: No   Food Insecurity: Low Risk  (4/29/2024)    Food Insecurity      Within the past 12 months, did you worry that your food would run out before you got money to buy more?: No      Within the past 12 months, did the food you bought just not last and you didn t have money to get more?: No   Transportation Needs: Low Risk  (4/29/2024)    Transportation Needs      Within the past 12 months, has lack of transportation kept you from medical appointments, getting your medicines, non-medical meetings or appointments, work, or from getting things that you need?: No   Physical Activity: Sufficiently Active (4/29/2024)    Exercise Vital Sign      Days of Exercise per Week: 6 days      Minutes of Exercise per Session: 60 min   Stress: Stress Concern Present (4/29/2024)    [employer] - Occupational Stress Questionnaire      Feeling of Stress : To some extent   Social Connections: Unknown (4/29/2024)    Social Connection and Isolation Panel [NHANES]      Frequency of Communication with Friends and Family: Not on file      Frequency of Social Gatherings with Friends and Family: More than three times a week      Attends Caodaism Services: Not on file      Active Member of Clubs or Organizations: Not on file      Attends Club or Organization Meetings: Not on file      Marital Status: Not on file   Interpersonal Safety: Low Risk  (5/2/2024)    Interpersonal Safety      Do you feel physically and emotionally safe where you currently live?: Yes      Within the past 12 months, have you been hit, slapped, kicked or otherwise physically hurt by someone?: No      Within the past 12 months, have you been humiliated or emotionally abused in other ways by your partner or  ex-partner?: No   Housing Stability: Low Risk  (4/29/2024)    Housing Stability      Do you have housing? : Yes      Are you worried about losing your housing?: No             Family History:     Family History   Problem Relation Age of Onset     Asthma Brother         Exercise induced asthma     C.A.D. No family hx of      Diabetes No family hx of      Hypertension No family hx of      Cerebrovascular Disease No family hx of      Breast Cancer No family hx of    No compression palsies, none for MS, or neuropathies          Allergies:      Allergies   Allergen Reactions     Azithromycin Dihydrate [Azithromycin]      Zithromax [Azithromycin Dihydrate]      ? Upset stomach             Medications:     Current Outpatient Medications:      desogestrel-ethinyl estradiol (ISIBLOOM) 0.15-30 MG-MCG tablet, Take 1 tablet by mouth daily. OK to use continuously, Disp: 84 tablet, Rfl: 1     escitalopram (LEXAPRO) 5 MG tablet, Take 1 tablet (5 mg) by mouth daily, Disp: 90 tablet, Rfl: 3          Physical Exam:   /80 (BP Location: Right arm, Patient Position: Sitting, Cuff Size: Adult Regular)   Pulse 79    Physical Exam: row of bruising in the posterior right thigh, within the region of numbness, laterally  Neurologic:   Mental Status Exam: Alert, awake and oriented to situation. No dysarthria. Speech of normal fluency.   Cranial Nerves: Fundoscopic exam with clear disc margins bilaterally. PERRLA, EOMs intact, no nystagmus, facial movements symmetric, facial sensation intact to light touch, hearing intact to conversation, trapezius and SCMs 5/5 bilaterally, tongue midline and fully mobile. No tongue atrophy.    Motor: Normal tone in all four extremities, no atrophy. 5/5 strength bilaterally in shoulder abduction, elbow extensors and flexors, wrist extensors and flexors, hip flexors, knee extensors and flexors, dorsi- and plantarflexion.    Sensory: Sensation intact to pinprick and vibration sensation on distal arms and  legs bilaterally. Lateral sensory loss to pinprick along the distribution of the femoral cutaneous nerve, 50% reduction compared to the left side. No tinel's sign at the inguinal canal.    Coordination: Finger-nose-finger intact bilaterally.  Rapidly alternating movements intact bilaterally in the upper extremities.  Normal finger tapping bilaterally.  Intact heel-shin bilaterally.   Reflexes: 2+ and symmetric in triceps, biceps, brachioradialis, patellar, Achilles, and plantars downgoing bilaterally.   Gait: Normal gait.  Able to toe and heel walk.  Tandem gait normal.  Eyes: No conjunctival injection, no scleral icterus.           Data:     None available, glucose normal as of 5/2/2024        Again, thank you for allowing me to participate in the care of your patient.        Sincerely,        Manasa Terrell MD    Electronically signed

## 2025-02-11 ENCOUNTER — E-VISIT (OUTPATIENT)
Dept: URGENT CARE | Facility: CLINIC | Age: 20
End: 2025-02-11
Payer: COMMERCIAL

## 2025-02-11 DIAGNOSIS — J11.1 INFLUENZA: Primary | ICD-10-CM

## 2025-02-11 RX ORDER — OSELTAMIVIR PHOSPHATE 75 MG/1
75 CAPSULE ORAL 2 TIMES DAILY
Qty: 10 CAPSULE | Refills: 0 | Status: SHIPPED | OUTPATIENT
Start: 2025-02-11 | End: 2025-02-16

## 2025-02-11 NOTE — PATIENT INSTRUCTIONS
Thank you for choosing us for your care. I have placed an order for a prescription so that you can start treatment. View your full visit summary for details by clicking on the link below. Your pharmacist will able to address any questions you may have about the medication.     If you're not feeling better within 5-7 days, please schedule an appointment.  You can schedule an appointment right here in [facility], or call [phone]  If the visit is for the same symptoms as your eVisit, we'll refund the cost of your eVisit if seen within seven days.

## 2025-02-12 DIAGNOSIS — F41.1 GAD (GENERALIZED ANXIETY DISORDER): ICD-10-CM

## 2025-02-12 DIAGNOSIS — F32.0 MILD MAJOR DEPRESSION, SINGLE EPISODE: ICD-10-CM

## 2025-02-12 RX ORDER — ESCITALOPRAM OXALATE 5 MG/1
5 TABLET ORAL DAILY
Qty: 90 TABLET | Refills: 3 | OUTPATIENT
Start: 2025-02-12

## 2025-03-03 DIAGNOSIS — Z30.09 GENERAL COUNSELING FOR PRESCRIPTION OF ORAL CONTRACEPTIVES: ICD-10-CM

## 2025-03-03 RX ORDER — DESOGESTREL AND ETHINYL ESTRADIOL 0.15-0.03
1 KIT ORAL DAILY
Qty: 84 TABLET | Refills: 1 | Status: SHIPPED | OUTPATIENT
Start: 2025-03-03

## 2025-04-15 ENCOUNTER — PATIENT OUTREACH (OUTPATIENT)
Dept: CARE COORDINATION | Facility: CLINIC | Age: 20
End: 2025-04-15
Payer: COMMERCIAL

## 2025-04-29 ENCOUNTER — PATIENT OUTREACH (OUTPATIENT)
Dept: CARE COORDINATION | Facility: CLINIC | Age: 20
End: 2025-04-29
Payer: COMMERCIAL

## 2025-05-14 DIAGNOSIS — F41.1 GAD (GENERALIZED ANXIETY DISORDER): ICD-10-CM

## 2025-05-14 DIAGNOSIS — F32.0 MILD MAJOR DEPRESSION, SINGLE EPISODE: ICD-10-CM

## 2025-05-15 RX ORDER — ESCITALOPRAM OXALATE 5 MG/1
5 TABLET ORAL DAILY
Qty: 90 TABLET | Refills: 3 | Status: SHIPPED | OUTPATIENT
Start: 2025-05-15

## 2025-07-07 DIAGNOSIS — Z30.09 GENERAL COUNSELING FOR PRESCRIPTION OF ORAL CONTRACEPTIVES: ICD-10-CM

## 2025-07-07 RX ORDER — DESOGESTREL AND ETHINYL ESTRADIOL 0.15-0.03
1 KIT ORAL DAILY
Qty: 28 TABLET | Refills: 0 | Status: SHIPPED | OUTPATIENT
Start: 2025-07-07

## 2025-07-08 RX ORDER — DESOGESTREL AND ETHINYL ESTRADIOL 0.15-0.03
1 KIT ORAL DAILY
Qty: 28 TABLET | Refills: 0 | OUTPATIENT
Start: 2025-07-08

## 2025-08-02 DIAGNOSIS — Z30.09 GENERAL COUNSELING FOR PRESCRIPTION OF ORAL CONTRACEPTIVES: ICD-10-CM

## 2025-08-05 DIAGNOSIS — Z30.09 GENERAL COUNSELING FOR PRESCRIPTION OF ORAL CONTRACEPTIVES: ICD-10-CM

## 2025-08-05 RX ORDER — DESOGESTREL AND ETHINYL ESTRADIOL 0.15-0.03
1 KIT ORAL DAILY
Qty: 28 TABLET | Refills: 0 | Status: SHIPPED | OUTPATIENT
Start: 2025-08-05 | End: 2025-08-05

## 2025-08-06 RX ORDER — DESOGESTREL AND ETHINYL ESTRADIOL 0.15-0.03
1 KIT ORAL DAILY
Qty: 90 TABLET | Refills: 2 | Status: SHIPPED | OUTPATIENT
Start: 2025-08-06

## 2025-08-13 SDOH — HEALTH STABILITY: PHYSICAL HEALTH: ON AVERAGE, HOW MANY MINUTES DO YOU ENGAGE IN EXERCISE AT THIS LEVEL?: 120 MIN

## 2025-08-13 SDOH — HEALTH STABILITY: PHYSICAL HEALTH: ON AVERAGE, HOW MANY DAYS PER WEEK DO YOU ENGAGE IN MODERATE TO STRENUOUS EXERCISE (LIKE A BRISK WALK)?: 5 DAYS

## 2025-08-13 ASSESSMENT — PATIENT HEALTH QUESTIONNAIRE - PHQ9
SUM OF ALL RESPONSES TO PHQ QUESTIONS 1-9: 9
SUM OF ALL RESPONSES TO PHQ QUESTIONS 1-9: 9
10. IF YOU CHECKED OFF ANY PROBLEMS, HOW DIFFICULT HAVE THESE PROBLEMS MADE IT FOR YOU TO DO YOUR WORK, TAKE CARE OF THINGS AT HOME, OR GET ALONG WITH OTHER PEOPLE: VERY DIFFICULT

## 2025-08-13 ASSESSMENT — SOCIAL DETERMINANTS OF HEALTH (SDOH): HOW OFTEN DO YOU GET TOGETHER WITH FRIENDS OR RELATIVES?: ONCE A WEEK

## 2025-08-14 ENCOUNTER — OFFICE VISIT (OUTPATIENT)
Dept: PEDIATRICS | Facility: CLINIC | Age: 20
End: 2025-08-14
Payer: COMMERCIAL

## 2025-08-14 VITALS
HEIGHT: 71 IN | BODY MASS INDEX: 25.4 KG/M2 | SYSTOLIC BLOOD PRESSURE: 123 MMHG | HEART RATE: 90 BPM | WEIGHT: 181.4 LBS | OXYGEN SATURATION: 100 % | TEMPERATURE: 98 F | RESPIRATION RATE: 18 BRPM | DIASTOLIC BLOOD PRESSURE: 84 MMHG

## 2025-08-14 DIAGNOSIS — F41.1 GAD (GENERALIZED ANXIETY DISORDER): ICD-10-CM

## 2025-08-14 DIAGNOSIS — Z12.4 CERVICAL CANCER SCREENING: ICD-10-CM

## 2025-08-14 DIAGNOSIS — Z00.00 ROUTINE GENERAL MEDICAL EXAMINATION AT A HEALTH CARE FACILITY: Primary | ICD-10-CM

## 2025-08-14 DIAGNOSIS — Z11.3 SCREENING FOR STDS (SEXUALLY TRANSMITTED DISEASES): ICD-10-CM

## 2025-08-14 DIAGNOSIS — F32.0 MILD MAJOR DEPRESSION, SINGLE EPISODE: ICD-10-CM

## 2025-08-14 DIAGNOSIS — L70.0 ACNE VULGARIS: ICD-10-CM

## 2025-08-14 RX ORDER — ESCITALOPRAM OXALATE 10 MG/1
5 TABLET ORAL DAILY
Qty: 90 TABLET | Refills: 3 | Status: SHIPPED | OUTPATIENT
Start: 2025-08-14

## 2025-08-14 RX ORDER — CLINDAMYCIN PHOSPHATE 10 MG/G
GEL TOPICAL 2 TIMES DAILY
Qty: 60 G | Refills: 2 | Status: SHIPPED | OUTPATIENT
Start: 2025-08-14

## 2025-08-19 LAB
BKR LAB AP GYN ADEQUACY: NORMAL
BKR LAB AP GYN INTERPRETATION: NORMAL
BKR LAB AP HPV REFLEX: NO
BKR LAB AP PREVIOUS ABNORMAL: NORMAL
PATH REPORT.COMMENTS IMP SPEC: NORMAL
PATH REPORT.COMMENTS IMP SPEC: NORMAL
PATH REPORT.RELEVANT HX SPEC: NORMAL

## 2025-08-31 ENCOUNTER — MYC MEDICAL ADVICE (OUTPATIENT)
Dept: PEDIATRICS | Facility: CLINIC | Age: 20
End: 2025-08-31
Payer: COMMERCIAL

## 2025-08-31 DIAGNOSIS — F32.0 MILD MAJOR DEPRESSION, SINGLE EPISODE: ICD-10-CM

## 2025-08-31 DIAGNOSIS — F41.1 GAD (GENERALIZED ANXIETY DISORDER): ICD-10-CM

## 2025-09-02 RX ORDER — ESCITALOPRAM OXALATE 10 MG/1
10 TABLET ORAL DAILY
Qty: 90 TABLET | Refills: 3 | Status: SHIPPED | OUTPATIENT
Start: 2025-09-02